# Patient Record
Sex: FEMALE | Race: WHITE | Employment: FULL TIME | URBAN - METROPOLITAN AREA
[De-identification: names, ages, dates, MRNs, and addresses within clinical notes are randomized per-mention and may not be internally consistent; named-entity substitution may affect disease eponyms.]

---

## 2017-02-01 ENCOUNTER — OFFICE VISIT (OUTPATIENT)
Dept: FAMILY MEDICINE CLINIC | Age: 22
End: 2017-02-01

## 2017-02-01 VITALS
RESPIRATION RATE: 16 BRPM | TEMPERATURE: 97.3 F | DIASTOLIC BLOOD PRESSURE: 59 MMHG | BODY MASS INDEX: 24.63 KG/M2 | HEIGHT: 63 IN | SYSTOLIC BLOOD PRESSURE: 106 MMHG | WEIGHT: 139 LBS | OXYGEN SATURATION: 100 % | HEART RATE: 72 BPM

## 2017-02-01 DIAGNOSIS — F90.9 ATTENTION DEFICIT HYPERACTIVITY DISORDER (ADHD), UNSPECIFIED ADHD TYPE: Primary | ICD-10-CM

## 2017-02-01 RX ORDER — METHYLPHENIDATE HYDROCHLORIDE 18 MG/1
18 TABLET, EXTENDED RELEASE ORAL
Qty: 30 TAB | Refills: 0 | Status: SHIPPED | OUTPATIENT
Start: 2017-02-01 | End: 2017-05-01 | Stop reason: DRUGHIGH

## 2017-02-01 NOTE — PATIENT INSTRUCTIONS
Welcome! I treat ADHD here, but I require that the patient be worked up by a specialist and properly diagnosed before I treat. The insurance companies are also refusing to pay for medication unless it is diagnosed properly. Please sign the release so we can get your records here. My patients with ADD or ADHD I have to physically see in the office every 3 months. At one month and 2 months, you may call or e-mail for a refill and pick it up at the desk. If dosing adjustments need to be made you will have to be seen.  If it gets too complicated, I will refer you to a specialist.

## 2017-02-01 NOTE — PROGRESS NOTES
HISTORY OF PRESENT ILLNESS  Arnav Bingham is a 19 y.o. female. HPI Comments: Meliton Arreola is here to establish care (she was referred here by a friend that comes here) and get back on meds for ADHD. She was diagnosed when she was in 2nd grade (she thinks the pediatrician) and most recently was getting the medication from her dad's doctor before moving here. She hasn't had the medication since the fall and notices a difference without it. She was on Concerta, doesn't remember the dose but recalls that the doctor had to lower the dose   Otherwise, she is healthy, no medical problems. Review of Systems   Constitutional: Negative for chills and fever. HENT: Negative for congestion, ear pain and sore throat. Eyes: Negative for discharge and redness. Respiratory: Negative for cough and shortness of breath. Cardiovascular: Negative for chest pain, palpitations and orthopnea. Gastrointestinal: Negative for abdominal pain, nausea and vomiting. Skin: Negative for rash. Neurological: Negative for weakness and headaches. Endo/Heme/Allergies: Does not bruise/bleed easily. Physical Exam   Constitutional: Vital signs are normal. She appears well-developed and well-nourished. HENT:   Right Ear: Tympanic membrane and ear canal normal.   Left Ear: Tympanic membrane and ear canal normal.   Nose: Nose normal.   Mouth/Throat: Uvula is midline, oropharynx is clear and moist and mucous membranes are normal.   Eyes: Pupils are equal, round, and reactive to light. Neck: No thyromegaly present. Cardiovascular: Normal rate, regular rhythm and normal heart sounds. No murmur heard. Pulmonary/Chest: Effort normal and breath sounds normal. No respiratory distress. She has no wheezes. She has no rales. Lymphadenopathy:     She has no cervical adenopathy. Skin: No rash noted. Psychiatric: She has a normal mood and affect. Nursing note and vitals reviewed.       ASSESSMENT and PLAN    ICD-10-CM ICD-9-CM 1. Attention deficit hyperactivity disorder (ADHD), unspecified ADHD type F90.9 314.01        AVS instructions reviewed with patient, pt verbalized understanding

## 2017-02-01 NOTE — PROGRESS NOTES
Rm 1  Pt presents to the clinic for a medication refill. Pt takes concerta, but can't remember the dosage. Flu shot requested: no    Depression Screening Completed: yes    Learning Assessment Completed: yes    Abuse Screening Completed: n/a    Health Maintenance reviewed and discussed per provider: yes    Advance Directive:    1. Do you have an advance directive in place? Patient Reply: no    2. If not, would you like material regarding how to put one in place? Patient Reply: no     Coordination of Care:    1. Have you been to the ER, urgent care clinic since your last visit? Hospitalized since your last visit? no    2. Have you seen or consulted any other health care providers outside of the [address] [place] [address] since your last visit? Include any pap smears or colon screening.  no

## 2017-02-01 NOTE — MR AVS SNAPSHOT
Visit Information Date & Time Provider Department Dept. Phone Encounter #  
 2/1/2017  7:15 AM Blanca Helton  [address] [phone] [id number] Follow-up Instructions Return in about 3 months (around 5/1/2017), or if symptoms worsen or fail to improve. Upcoming Health Maintenance Date Due  
 HPV AGE 9Y-34Y (1 of 3 - Female 3 Dose Series) 5/5/2004 DTaP/Tdap/Td series (1 - Tdap) 5/5/2014 PAP AKA CERVICAL CYTOLOGY 5/5/2014 INFLUENZA AGE 9 TO ADULT 8/1/2016 Allergies as of 2/1/2017  Review Complete On: 2/1/2017 By: Antony Henriquez LPN No Known Allergies Current Immunizations  Never Reviewed No immunizations on file. Not reviewed this visit You Were Diagnosed With   
  
 Codes Comments Attention deficit hyperactivity disorder (ADHD), unspecified ADHD type    -  Primary ICD-10-CM: F90.9 ICD-9-CM: 314.01 Vitals BP Pulse Temp Resp Height(growth percentile) Weight(growth percentile) 106/59 (BP 1 Location: Right arm, BP Patient Position: Sitting) 72 97.3 °F (36.3 °C) (Oral) 16 5' 3\" (1.6 m) 139 lb (63 kg) LMP SpO2 BMI OB Status Smoking Status 01/16/2017 (Approximate) 100% 24.62 kg/m2 Having regular periods Never Smoker Vitals History BMI and BSA Data Body Mass Index Body Surface Area  
 24.62 kg/m 2 1.67 m 2 Your Updated Medication List  
  
   
This list is accurate as of: 2/1/17  7:57 AM.  Always use your most recent med list.  
  
  
  
  
 CONCERTA 18 mg CR tablet Generic drug:  methylphenidate Take 1 Tab (18 mg total) by mouth every morning. Max Daily Amount: 18 mg  
  
  
  
  
Prescriptions Printed Refills CONCERTA 18 mg CR tablet 0 Sig: Take 1 Tab (18 mg total) by mouth every morning. Max Daily Amount: 18 mg Class: Print Route: Oral  
  
Follow-up Instructions Return in about 3 months (around 5/1/2017), or if symptoms worsen or fail to improve. Patient Instructions Welcome! I treat ADHD here, but I require that the patient be worked up by a specialist and properly diagnosed before I treat. The insurance companies are also refusing to pay for medication unless it is diagnosed properly. Please sign the release so we can get your records here. My patients with ADD or ADHD I have to physically see in the office every 3 months. At one month and 2 months, you may call or e-mail for a refill and pick it up at the desk. If dosing adjustments need to be made you will have to be seen. If it gets too complicated, I will refer you to a specialist. 
 
 
 
  
Introducing [facility]! Amy Curry introduces [employer] patient portal. Now you can access parts of your medical record, email your doctor's office, and request medication refills online. 1. In your internet browser, go to [email or web address] 2. Click on the First Time User? Click Here link in the Sign In box. You will see the New Member Sign Up page. 3. Enter your [employer] Access Code exactly as it appears below. You will not need to use this code after youve completed the sign-up process. If you do not sign up before the expiration date, you must request a new code. · [employer] Access Code: [email or web address] Expires: 5/2/2017  7:57 AM 
 
4. Enter the last four digits of your Social Security Number (xxxx) and Date of Birth (mm/dd/yyyy) as indicated and click Submit. You will be taken to the next sign-up page. 5. Create a [employer] ID. This will be your [employer] login ID and cannot be changed, so think of one that is secure and easy to remember. 6. Create a [employer] password. You can change your password at any time. 7. Enter your Password Reset Question and Answer. This can be used at a later time if you forget your password. 8. Enter your e-mail address. You will receive e-mail notification when new information is available in [address] 9. Click Sign Up. You can now view and download portions of your medical record. 10. Click the Download Summary menu link to download a portable copy of your medical information. If you have questions, please visit the Frequently Asked Questions section of the "[employer]" website. Remember, "[employer]" is NOT to be used for urgent needs. For medical emergencies, dial 911. Now available from your iPhone and Android! Please provide this summary of care documentation to your next provider. If you have any questions after today's visit, please call [phone].

## 2017-03-17 ENCOUNTER — OFFICE VISIT (OUTPATIENT)
Dept: OBGYN CLINIC | Age: 22
End: 2017-03-17

## 2017-03-17 VITALS
DIASTOLIC BLOOD PRESSURE: 63 MMHG | WEIGHT: 151 LBS | HEART RATE: 68 BPM | HEIGHT: 63 IN | SYSTOLIC BLOOD PRESSURE: 102 MMHG | BODY MASS INDEX: 26.75 KG/M2

## 2017-03-17 DIAGNOSIS — Z30.09 FAMILY PLANNING: Primary | ICD-10-CM

## 2017-03-17 NOTE — PROGRESS NOTES
Name: Geraldo Díaz MRN: [id number]    YOB: 1995  Age: 19 y.o. Sex: female        Chief Complaint   Patient presents with    Family Planning       HPI    1. Family planning--> Notes that she got  in August of this year, using withdrawal and Plan B for contraception, does not want to have children for another 2+ years, used pills in the past but did not remember it well. Would like to know best birth control    OB History      Para Term  AB TAB SAB Ectopic Multiple Living    0 0 0 0 0 0 0 0 0 0        Obstetric Comments      Patient's last menstrual period was 2017. Periods regular, last 4-5 days, flow medium, moderate dysmenorrhea  History of sexually transmitted infections never  Last pap 1-2 years ago           PGyn  History   Sexual Activity    Sexual activity: Yes    Partners: Male    Birth control/ protection: None     Comment: happy with her          Past Medical History:   Diagnosis Date    ADHD (attention deficit hyperactivity disorder)        History reviewed. No pertinent surgical history. Allergies   Allergen Reactions    Latex Swelling       Current Outpatient Prescriptions on File Prior to Visit   Medication Sig Dispense Refill    CONCERTA 18 mg CR tablet Take 1 Tab (18 mg total) by mouth every morning. Max Daily Amount: 18 mg 30 Tab 0     No current facility-administered medications on file prior to visit. Social History     Social History    Marital status:      Spouse name: N/A    Number of children: N/A    Years of education: N/A     Occupational History    Not on file.      Social History Main Topics    Smoking status: Never Smoker    Smokeless tobacco: Never Used    Alcohol use 1.8 oz/week     3 Cans of beer per week      Comment: per week    Drug use: No    Sexual activity: Yes     Partners: Male     Birth control/ protection: None      Comment: happy with her      Other Topics Concern    Not on file     Social History Narrative       Family History   Problem Relation Age of Onset    Cancer Mother      skin    Cancer Father      prostate    Heart Attack Father     Ovarian Cancer Paternal Grandmother        Review of Systems   Constitutional: Negative. HENT: Negative. Respiratory: Negative. Cardiovascular: Negative. Gastrointestinal: Negative. Genitourinary: Negative. Musculoskeletal: Negative. Skin: Negative. Neurological: Negative. Psychiatric/Behavioral: Negative. Visit Vitals    /63 (BP 1 Location: Left arm, BP Patient Position: Sitting)    Pulse 68    Ht 5' 3\" (1.6 m)    Wt 151 lb (68.5 kg)    LMP 03/17/2017    BMI 26.75 kg/m2       GENERAL:  Well developed, well nourished, in no distress  NEURO/PSYCHE: Grossly intact, normal mood and affect  HEENT: Normal cephalic, atraumatic, good dentition, neck supple. No thyromegaly  CV: regular rate and rhythm  LUNGS: clear to auscultation bilaterally, no wheezes, rhonchi or rales, good air entry with normal effort  ABDOMEN: + BS, soft without tenderness, no guarding, rebound or masses  EXTREMITIES: no edema or erythema noted  SKIN:  Warm, dry, no lesions          No results found for this or any previous visit (from the past 24 hour(s)). 1. Family planning  Reviewed all forms of birth control with pt, all of her questions were answered, is most interested in a LARC, may go to [place] or [place] to get a nexplanon. Reviewed that we would be happy to do a IUD for her. All of her questions were answered    2.  Dysmenorrhea  Will likely improve with BC    F/U 3 mos for well woman    Face to face time 30 mins with greater than 50% counseling      Face to face time 30 mins with greater than 50% counseling

## 2017-03-17 NOTE — PATIENT INSTRUCTIONS
Implant for Birth Control: Care Instructions  Your Care Instructions    The implant is used to prevent pregnancy. It's a thin dhara about the size of a matchstick that is inserted under the skin (subdermal) on the inside of your arm. The implant prevents pregnancy for 3 years. After it is put in, you don't have to do anything else to prevent pregnancy. Follow-up care is a key part of your treatment and safety. Be sure to make and go to all appointments, and call your doctor if you are having problems. It's also a good idea to know your test results and keep a list of the medicines you take. How can you care for yourself at home? How do you use the subdermal implant? · The implant is put in and taken out by your doctor or another trained health professional. This is done in your doctor's office. It only takes a few minutes. · Ask your doctor if you need to use backup birth control, such as a condom. And ask if (and how long) you should avoid intercourse after you get the implant. You may need to do this, depending on where you are in your cycle. What else do you need to know? · The implant has side effects. ¨ You may have changes in your period. Your period may stop. You may also have spotting or bleeding between periods. ¨ You may have mood changes, less interest in sex, or weight gain. · Remember that 3 years after you receive the implant, you must have it removed or get a new one. ¨ If you don't replace the implant and don't use another form of birth control, you could get pregnant. ¨ If you have the implant removed, you'll have to find another method of birth control. If you don't, you may get pregnant. ¨ Even if you are planning to get pregnant, you have to have the implant removed. · Check with your doctor before you use any other medicines. This includes over-the-counter medicines, vitamins, herbal products, and supplements.  Birth control hormones may not work as well to prevent pregnancy when combined with other medicines. · The implant doesn't protect against sexually transmitted infections (STIs), such as herpes or HIV/AIDS. If you're not sure if your sex partner might have an STI, use a condom to protect against infection. When should you call for help? Call your doctor now or seek immediate medical care if:  · You have severe belly pain. Watch closely for changes in your health, and be sure to contact your doctor if:  · You think you might be pregnant. · You have any problems with your birth control method. · You think you may be depressed. · You regularly have spotting. · You think you may have been exposed to or have a sexually transmitted infection. Where can you learn more? Go to http://tamy-keerthi.info/. Enter Q221 in the search box to learn more about \"Implant for Birth Control: Care Instructions. \"  Current as of: May 30, 2016  Content Version: 11.1  © 2999-4970 [employer]. Care instructions adapted under license by [employer] (which disclaims liability or warranty for this information). If you have questions about a medical condition or this instruction, always ask your healthcare professional. [place] [address] any warranty or liability for your use of this information. Ring for Birth Control: Care Instructions  Your Care Instructions    The ring is used to prevent pregnancy. It's a soft plastic ring that you put into your vagina. It's also called the vaginal ring. It gives you a regular dose of the hormones estrogen and progestin. The ring protects against pregnancy for 1 month at a time. You wear one ring for 3 weeks in a row and then go without a ring for 1 week. During this week, you have your period. Follow-up care is a key part of your treatment and safety. Be sure to make and go to all appointments, and call your doctor if you are having problems.  It's also a good idea to know your test results and keep a list of the medicines you take. How can you care for yourself at home? How do you use the ring? · Talk to your doctor about the best day to start using the ring. Usually, a ring is started during one of the first 5 days of your period. Ask your doctor if you need to avoid intercourse or use backup birth control, such as a condom, for the first 7 days. · Insert the ring according to the package instructions. You can't put the ring in incorrectly. It works no matter what its position in the vagina is. · Note the day you put the ring in. Leave the ring in for 3 weeks. You don't have to remove the ring when you have intercourse. · When the 3 weeks are up, take the ring out on the same day of the week that you put it in. Don't use another ring for 7 days. You will have your period during these 7 days. · After the 7-day break, put in a new ring on the same day of the week that you did 4 weeks earlier. You may still be having your period. What if you forget to change the ring or it comes out? Always read the label for specific instructions, or call your doctor. Here are some basic guidelines:  · If you leave the ring in for more than 4 weeks, remove it. Put in a new ring. Use backup birth control, such as a condom, or don't have intercourse until the new ring has been in place for 7 days. If you had intercourse, you can use emergency contraception, such as the morning-after pill (Plan B). You can use emergency contraception for up to 5 days after having had sex, but it works best if you take it right away. · If a ring slips out and it is out of your vagina for less than 3 hours, you are still protected from pregnancy. The ring can be rinsed and reinserted. · If a ring is out of the vagina for more than 3 hours, you may not be protected from pregnancy. Rinse and reinsert the ring or put in a new one as soon as possible.  Use backup birth control or don't have intercourse until a new ring has been in place for 7 days. If you had intercourse, you can use emergency contraception. What else do you need to know? · The ring has side effects. ¨ You may have very light or skipped periods. ¨ You may have bleeding between periods (spotting). This usually decreases after 3 to 4 months. ¨ You may have mood changes, less interest in sex, or weight gain. ¨ You may have vaginal discharge or irritation of the vagina. · Check with your doctor before you use any other medicines, including over-the-counter medicines, vitamins, herbal products, and supplements. Birth control hormones may not work as well to prevent pregnancy when combined with other medicines. · The ring doesn't protect against sexually transmitted diseases (STDs), such as herpes or HIV/AIDS. If you're not sure whether your sex partner might have an STD, use a condom to protect against disease. When should you call for help? Call your doctor now or seek immediate medical care if:  · You have severe belly pain. · You have signs of a blood clot, such as:  ¨ Pain in your calf, back of the knee, thigh, or groin. ¨ Redness and swelling in your leg or groin. · You have blurred vision or other problems seeing. · You have a severe headache. · You have severe trouble breathing. Watch closely for changes in your health, and be sure to contact your doctor if:  · You think you might be pregnant. · You think you may be depressed. · You think you may have been exposed to or have a sexually transmitted disease. Where can you learn more? Go to http://tamy-keerthi.info/. Zeinab Shi in the search box to learn more about \"Ring for Birth Control: Care Instructions. \"  Current as of: May 30, 2016  Content Version: 11.1  © 3643-3614 [employer]. Care instructions adapted under license by [employer] (which disclaims liability or warranty for this information).  If you have questions about a medical condition or this instruction, always ask your healthcare professional. [place] [address] any warranty or liability for your use of this information. Intrauterine Device (IUD) Insertion: Care Instructions  Your Care Instructions    The intrauterine device (IUD) is a very effective method of birth control. It is a small, plastic, T-shaped device that contains copper or hormones. The doctor inserts the IUD into your uterus. A plastic string tied to the end of the IUD hangs down through the cervix into the vagina. There are two types of IUDs. The copper IUD is effective for up to 10 years. The hormonal IUD is effective for either 3 years or 5 years, depending on which IUD is used. The hormonal IUD also reduces menstrual bleeding and cramping. Both types of IUD damage or kill the man's sperm. This means that the woman's egg does not join with the sperm. IUDs also change the lining of the uterus so that the egg does not lodge there. The IUD is most likely to work well for women who have been pregnant before. Some women who have never been pregnant have more trouble keeping the IUD in the uterus. They also may have more pain and cramping after insertion. Follow-up care is a key part of your treatment and safety. Be sure to make and go to all appointments, and call your doctor if you are having problems. It's also a good idea to know your test results and keep a list of the medicines you take. How can you care for yourself at home? · You may experience some mild cramping and light bleeding (spotting) for 1 or 2 days. Use a hot water bottle or a heating pad set on low on your belly for pain. · Take an over-the-counter pain medicine, such as acetaminophen (Tylenol), ibuprofen (Advil, Motrin), and naproxen (Aleve) if needed. Read and follow all instructions on the label. · Do not take two or more pain medicines at the same time unless the doctor told you to. Many pain medicines have acetaminophen, which is Tylenol.  Too much acetaminophen (Tylenol) can be harmful. · Check the string of your IUD after every period. To do this, insert a finger into your vagina and feel for the cervix, which is at the top of the vagina and feels harder than the rest of your vagina. You should be able to feel the thin, plastic string coming out of the opening of your cervix. If you cannot feel the string, use another form of birth control and make an appointment with your doctor to have the string checked. · If the IUD comes out, save it and call your doctor. Be sure to use another form of birth control while the IUD is out. · Use latex condoms to protect against sexually transmitted infections (STIs), such as gonorrhea and chlamydia. An IUD does not protect you from STIs. Having one sex partner (who does not have STIs and does not have sex with anyone else) is a good way to avoid STIs. When should you call for help? Call 911 anytime you think you may need emergency care. For example, call if:  · You passed out (lost consciousness). · You have sudden, severe pain in your belly or pelvis. Call your doctor now or seek immediate medical care if:  · You have new belly or pelvic pain. · You have severe vaginal bleeding. This means that you are soaking through your usual pads or tampons each hour for 2 or more hours. · You are dizzy or lightheaded, or you feel like you may faint. · You have a fever and pelvic pain or vaginal discharge. · You have pelvic pain that is getting worse. Watch closely for changes in your health, and be sure to contact your doctor if:  · You cannot feel the string, or the IUD comes out. · You feel sick to your stomach, or you vomit. · You think you may be pregnant. Where can you learn more? Go to http://tamy-keerthi.info/. Enter J866 in the search box to learn more about \"Intrauterine Device (IUD) Insertion: Care Instructions. \"  Current as of:  May 30, 2016  Content Version: 11.1  © 8954-6926 [employer], [employer]. Care instructions adapted under license by [employer] (which disclaims liability or warranty for this information). If you have questions about a medical condition or this instruction, always ask your healthcare professional. [place] [address] any warranty or liability for your use of this information. Intrauterine Device (IUD) for Birth Control: Care Instructions  Your Care Instructions    The intrauterine device (IUD) is used to prevent pregnancy. It's a small, plastic, T-shaped device. Your doctor places the IUD in your uterus. You are using either a hormonal IUD or a copper IUD. · Hormonal IUDs prevent pregnancy for 3 to 5 years, depending on which IUD is used. Once you have it, you don't have to do anything else to prevent pregnancy. · The copper IUD prevents pregnancy for 10 years. Once you have it, you don't have to do anything to prevent pregnancy. A string tied to the end of the IUD hangs down through the opening of the uterus (called the cervix) into the vagina. You can check that the IUD is in place by feeling for the string. The IUD usually stays in the uterus until your doctor removes it. Follow-up care is a key part of your treatment and safety. Be sure to make and go to all appointments, and call your doctor if you are having problems. It's also a good idea to know your test results and keep a list of the medicines you take. How can you care for yourself at home? How do you use the IUD? · Your doctor inserts the IUD. This takes only a few minutes and can be done at your doctor's office. · Your doctor may have you feel for the IUD string right after insertion, to be sure you know what it feels like. · Check for the string after every period.   ¨ Insert a finger into your vagina and feel for the cervix, which is at the top of the vagina and feels harder than the rest of your vagina (some women say it feels like the tip of your nose).  ¨ You should be able to feel the thin, plastic string coming out of the opening of your cervix. If you cannot feel the string, it doesn't always mean that the IUD is out of place. Sometimes the string is just difficult to feel or has been pulled up into the cervical canal (which will not harm you). · Your doctor may want to see you 4 to 6 weeks after the IUD insertion, to make sure it is in place. What if you think the IUD is not in place? Always read the label for specific instructions. Here are some basic guidelines:  · Call your doctor and use backup birth control, such as a condom, or don't have intercourse until you know the IUD is working. · If you have had intercourse, you can use emergency contraception, such as the morning-after pill (Plan B). You can use emergency contraception for up to 5 days after having had intercourse, but it works best if you take it right away. What else do you need to know? · The IUD has side effects. ¨ The hormonal IUD usually reduces menstrual flow and cramping over time. It can also cause spotting, mood swings, and breast tenderness. ¨ The copper IUD can cause longer and heavier periods. · After an IUD is first put in, you may have some mild cramping and light spotting for 1 to 2 days. · The IUD doesn't protect against sexually transmitted infections (STIs), such as herpes or HIV/AIDS. If you're not sure whether your sex partner might have an STI, use a condom to protect against disease. When should you call for help? Call your doctor now or seek immediate medical care if:  · You have severe pain in your belly or pelvis. · You have severe vaginal bleeding. This means that you are soaking through your usual pads or tampons each hour for 2 or more hours. · You have vaginal discharge that smells bad. · You have a fever and chills. · You think you might be pregnant.   Watch closely for changes in your health, and be sure to contact your doctor if:  · You cannot find the string of your IUD, or the string is shorter or longer than normal.  · You have any problems with your birth control method. · You think you may have been exposed to or have a sexually transmitted infection. Where can you learn more? Go to http://tamy-keerthi.info/. Enter B183 in the search box to learn more about \"Intrauterine Device (IUD) for Birth Control: Care Instructions. \"  Current as of: May 30, 2016  Content Version: 11.1  © 5805-0636 .[employer]. Care instructions adapted under license by [employer] (which disclaims liability or warranty for this information). If you have questions about a medical condition or this instruction, always ask your healthcare professional. [address] any warranty or liability for your use of this information.

## 2017-05-01 ENCOUNTER — OFFICE VISIT (OUTPATIENT)
Dept: FAMILY MEDICINE CLINIC | Age: 22
End: 2017-05-01

## 2017-05-01 VITALS
HEIGHT: 63 IN | BODY MASS INDEX: 27.64 KG/M2 | HEART RATE: 60 BPM | WEIGHT: 156 LBS | DIASTOLIC BLOOD PRESSURE: 70 MMHG | TEMPERATURE: 97 F | SYSTOLIC BLOOD PRESSURE: 117 MMHG | OXYGEN SATURATION: 100 % | RESPIRATION RATE: 16 BRPM

## 2017-05-01 DIAGNOSIS — Z13.31 SCREENING FOR DEPRESSION: ICD-10-CM

## 2017-05-01 DIAGNOSIS — F90.9 ATTENTION DEFICIT HYPERACTIVITY DISORDER (ADHD), UNSPECIFIED ADHD TYPE: Primary | ICD-10-CM

## 2017-05-01 RX ORDER — METHYLPHENIDATE HYDROCHLORIDE 27 MG/1
27 TABLET, EXTENDED RELEASE ORAL
Qty: 30 TAB | Refills: 0 | Status: SHIPPED | OUTPATIENT
Start: 2017-05-01

## 2017-05-01 NOTE — PROGRESS NOTES
Rm 1  Pt presents to the clinic for a med refill. Pt also wants to discuss a stronger dosage. Depression Screening Completed: yes    Learning Assessment Completed: yes    Abuse Screening Completed: n/a    Health Maintenance reviewed and discussed per provider: yes     Coordination of Care:    1. Have you been to the ER, urgent care clinic since your last visit? Hospitalized since your last visit? no    2. Have you seen or consulted any other health care providers outside of the [address] [place] [address] since your last visit? Include any pap smears or colon screening.  no

## 2017-05-01 NOTE — PROGRESS NOTES
HISTORY OF PRESENT ILLNESS  Arik Smith is a 19 y.o. female. HPI Comments: Pt is here for for 3 month follow up of ADD/ADHD. She feels like the medicine helps but wonders about bumping up the dose a bit. She's on 18 mg aily now. Denies chest pain, palpitations, insomnia. Medication Refill   Pertinent negatives include no chest pain, no abdominal pain, no headaches and no shortness of breath. Review of Systems   Constitutional: Negative for chills and fever. HENT: Negative for sore throat. Eyes: Negative for blurred vision and double vision. Respiratory: Negative for cough and shortness of breath. Cardiovascular: Negative for chest pain and palpitations. Gastrointestinal: Negative for abdominal pain, nausea and vomiting. Genitourinary: Negative for dysuria and urgency. Neurological: Negative for headaches. Physical Exam   Constitutional: Vital signs are normal. She appears well-developed and well-nourished. HENT:   Right Ear: Tympanic membrane and ear canal normal.   Left Ear: Tympanic membrane and ear canal normal.   Nose: Nose normal.   Mouth/Throat: Uvula is midline, oropharynx is clear and moist and mucous membranes are normal.   Eyes: Pupils are equal, round, and reactive to light. Neck: No thyromegaly present. Cardiovascular: Normal rate, regular rhythm and normal heart sounds. Pulmonary/Chest: Effort normal and breath sounds normal. No respiratory distress. She has no wheezes. Abdominal: She exhibits no distension. Lymphadenopathy:     She has no cervical adenopathy. Skin: No rash noted. Nursing note and vitals reviewed. ASSESSMENT and PLAN    ICD-10-CM ICD-9-CM    1. Attention deficit hyperactivity disorder (ADHD), unspecified ADHD type W39.9 361.71 CONCERTA 27 mg CR tablet       Will increase to 27 mg.     AVS instructions reviewed with patient, pt verbalized understanding

## 2017-05-01 NOTE — MR AVS SNAPSHOT
Visit Information Date & Time Provider Department Dept. Phone Encounter #  
 5/1/2017  7:00 AM Harriet Nelson, [address] [phone] [id number] Follow-up Instructions Return in about 3 months (around 8/1/2017). Your Appointments 6/19/2017  3:30 PM  
ANNUAL with Venus Hendrix MD  
[facility] OB [facility]YN (Paula Palacio) Appt Note: annual  
 [address], [place] [facility] [address] [phone]  
  
   
 [address], [address] [facility] Upcoming Health Maintenance Date Due  
 HPV AGE 9Y-34Y (1 of 3 - Female 3 Dose Series) 5/5/2004 DTaP/Tdap/Td series (1 - Tdap) 5/5/2014 PAP AKA CERVICAL CYTOLOGY 5/5/2014 INFLUENZA AGE 9 TO ADULT 8/1/2017 Allergies as of 5/1/2017  Review Complete On: 5/1/2017 By: Harriet Nelson MD  
  
 Severity Noted Reaction Type Reactions Latex  03/17/2017    Swelling Current Immunizations  Never Reviewed No immunizations on file. Not reviewed this visit You Were Diagnosed With   
  
 Codes Comments Attention deficit hyperactivity disorder (ADHD), unspecified ADHD type    -  Primary ICD-10-CM: F90.9 ICD-9-CM: 314.01 Vitals BP Pulse Temp Resp Height(growth percentile) Weight(growth percentile) 117/70 (BP 1 Location: Right arm, BP Patient Position: Sitting) 60 97 °F (36.1 °C) (Oral) 16 5' 3\" (1.6 m) 156 lb (70.8 kg) LMP SpO2 BMI OB Status Smoking Status 04/26/2017 (Approximate) 100% 27.63 kg/m2 Having regular periods Never Smoker Vitals History BMI and BSA Data Body Mass Index Body Surface Area  
 27.63 kg/m 2 1.77 m 2 Preferred Pharmacy Pharmacy Name Phone [facility] [address] [place] [address] [phone] Your Updated Medication List  
  
   
This list is accurate as of: 5/1/17  7:30 AM.  Always use your most recent med list.  
  
  
 CONCERTA 27 mg CR tablet Generic drug:  methylphenidate Take 1 Tab (27 mg total) by mouth every morning. Max Daily Amount: 27 mg  
  
  
  
  
Prescriptions Printed Refills CONCERTA 27 mg CR tablet 0 Sig: Take 1 Tab (27 mg total) by mouth every morning. Max Daily Amount: 27 mg  
 Class: Print Route: Oral  
  
Follow-up Instructions Return in about 3 months (around 8/1/2017). Patient Instructions Recheck 3 months, sooner if needed Introducing [facility] SERVICES! Jose Guadalupe Carlos introduces [employer] patient portal. Now you can access parts of your medical record, email your doctor's office, and request medication refills online. 1. In your internet browser, go to [email or web address] 2. Click on the First Time User? Click Here link in the Sign In box. You will see the New Member Sign Up page. 3. Enter your Mod[employer] Access Code exactly as it appears below. You will not need to use this code after youve completed the sign-up process. If you do not sign up before the expiration date, you must request a new code. · Mod[employer]ri Access Code: [email or web address] Expires: 5/2/2017  8:57 AM 
 
4. Enter the last four digits of your Social Security Number (xxxx) and Date of Birth (mm/dd/yyyy) as indicated and click Submit. You will be taken to the next sign-up page. 5. Create a Modustri ID. This will be your Mod[employer]ri login ID and cannot be changed, so think of one that is secure and easy to remember. 6. Create a Mod[employer] password. You can change your password at any time. 7. Enter your Password Reset Question and Answer. This can be used at a later time if you forget your password. 8. Enter your e-mail address. You will receive e-mail notification when new information is available in [address] 9. Click Sign Up. You can now view and download portions of your medical record.  
10. Click the Download Summary menu link to download a portable copy of your medical information. If you have questions, please visit the Frequently Asked Questions section of the [employer] website. Remember, [employer] is NOT to be used for urgent needs. For medical emergencies, dial 911. Now available from your iPhone and Android! Please provide this summary of care documentation to your next provider. If you have any questions after today's visit, please call [phone].

## 2017-08-22 ENCOUNTER — OFFICE VISIT (OUTPATIENT)
Dept: OBGYN CLINIC | Age: 22
End: 2017-08-22

## 2017-08-22 VITALS
HEART RATE: 84 BPM | BODY MASS INDEX: 27.64 KG/M2 | SYSTOLIC BLOOD PRESSURE: 122 MMHG | DIASTOLIC BLOOD PRESSURE: 74 MMHG | WEIGHT: 156 LBS | HEIGHT: 63 IN

## 2017-08-22 DIAGNOSIS — N92.6 IRREGULAR MENSES: Primary | ICD-10-CM

## 2017-08-22 LAB
HCG URINE, QL. (POC): NEGATIVE
VALID INTERNAL CONTROL?: YES

## 2017-08-22 RX ORDER — NORETHINDRONE ACETATE AND ETHINYL ESTRADIOL 1MG-20(21)
1 KIT ORAL DAILY
Qty: 28 TAB | Refills: 12 | Status: SHIPPED | OUTPATIENT
Start: 2017-08-22

## 2017-08-22 NOTE — PATIENT INSTRUCTIONS
Ethinyl Estradiol/Norethindrone Acetate (By mouth)   Ethinyl Estradiol (ETH-i-nil es-tra-DYE-ol), Norethindrone Acetate (nor-ETH-in-drone AS-e-graves)  Prevents pregnancy. Also treats hot flashes during menopause and helps prevent osteoporosis after menopause. Brand Name(s): Blisovi 1050 Meridium, Blisovi Fe 1.5/30, Blisovi Fe 1/20, Estrostep Fe, Femhrt, Femhrt 1/5, HARJAVALTA, Gildess 1.5/30, Gildess 1/20, Gildess 1050 Meridium, Gildess FE 1.5/30, Gildess FE 1/20, Jevantique Lo, Dicky [place] 1.5/30   There may be other brand names for this medicine. When This Medicine Should Not Be Used: This medicine is not right for everyone. Do not use it if you had an allergic reaction to ethinyl estradiol or norethindrone, or if you are pregnant or have unusual vaginal bleeding. Do not use it if you have liver disease, migraine headaches, breast cancer, or problems with blood clots. Do not use it if you have high blood pressure, certain heart problems, or diabetes with kidney, eye, nerve, or blood vessel damage. How to Use This Medicine:   Tablet  · Your doctor will tell you how much medicine to use. Do not use more than directed. · Each brand of birth control pills has specific directions. Read and follow the instructions for your prescribed brand. Ask your doctor or pharmacist if you have any questions. · Take this medicine at the same time each day. Birth control pills work best when there is no more than 24 hours between doses. · Your body will need at least 7 days to adjust before a pregnancy will be prevented when you first use this medicine. Use a second form of birth control, such as a condom, spermicide, or diaphragm, for the first 7 days of your first cycle of pills. · Read and follow the patient instructions that come with this medicine. Talk to your doctor or pharmacist if you have any questions. · Missed dose: This medicine has specific patient instructions on what to do if you miss a dose.  Read and follow these instructions carefully and call your doctor if you have any questions. ¨ Make sure your doctor knows if you miss your period 2 months in a row, because you could be pregnant. ¨ You may not have a period for that month if you miss more than one dose or change your schedule. ¨ You could have light bleeding or spotting if you do not take a pill on time. The more pills you miss, the more likely you are to have bleeding. · Store the medicine in a closed container at room temperature, away from heat, moisture, and direct light. Drugs and Foods to Avoid:   Ask your doctor or pharmacist before using any other medicine, including over-the-counter medicines, vitamins, and herbal products. · Some foods and medicines can affect how ethinyl estradiol and norethindrone combination works. Tell your doctor if you are also using Emily's wort, acetaminophen, aprepitant, aspirin, atorvastatin, boceprevir, bosentan, clofibrate, colesevelam, cyclosporine, morphine, prednisolone, rifabutin, rifampicin, rosuvastatin, telaprevir, temazepam, theophylline, tizanidine, medicine to treat an infection, medicine to treat HIV/AIDS, medicine for seizures, thyroid replacement medicine, or any other medicine that contains hormones, such as other treatments for menopause. · Do not eat grapefruit or drink grapefruit juice while you are using this medicine. Warnings While Using This Medicine:   · Tell your doctor right away if you think you are pregnant. This medicine could harm your unborn baby if you take it while you are pregnant. · Tell your doctor if you are breastfeeding, or if you had a baby within 4 weeks before you start using this medicine. Tell your doctor if you have kidney disease, asthma, cervical cancer, diabetes, epilepsy, gallbladder problems, heart or blood vessel disease, high cholesterol, or a family history of breast cancer or depression. Tell your doctor if you smoke.   · This medicine may cause the following problems:  ¨ Higher risk of heart attack, stroke, or blood clots  ¨ Possible risk of breast or cervical cancer  ¨ Liver cancers or tumors  ¨ Changes in vision  ¨ Gallbladder disease  ¨ High blood pressure  · You may need to stop using this medicine for a few weeks before and after you have surgery because of the risk of blood clots. · This medicine will not protect you from HIV/AIDS or other sexually transmitted diseases. · Tell any doctor or dentist who treats you that you are using this medicine. This medicine may affect certain medical test results. · Your doctor will check the effects of this medicine at regular visits. Keep all appointments. · Keep all medicine out of the reach of children. Never share your medicine with anyone. Possible Side Effects While Using This Medicine:   Call your doctor right away if you notice any of these side effects:  · Allergic reaction: Itching or hives, swelling in your face or hands, swelling or tingling in your mouth or throat, chest tightness, trouble breathing  · Chest pain that may spread, unusual sweating, or fainting  · Dark urine or pale stools, loss of appetite, stomach pain, yellow skin or eyes  · Fast or pounding heartbeat  · Numbness or weakness on one side of your body  · Pain in your lower leg (calf)  · Sudden or severe headache, problems with vision, speech, or walking  · Trouble breathing or coughing up blood  · Unusual or unexpected vaginal bleeding or heavy bleeding  If you notice these less serious side effects, talk with your doctor:   · Depression, mood changes  · Vision changes or trouble wearing contact lenses  If you notice other side effects that you think are caused by this medicine, tell your doctor. Call your doctor for medical advice about side effects.  You may report side effects to FDA at 7-043-FDA-7243  © 2017 [address] Information is for End User's use only and may not be sold, redistributed or otherwise used for commercial purposes. The above information is an  only. It is not intended as medical advice for individual conditions or treatments. Talk to your doctor, nurse or pharmacist before following any medical regimen to see if it is safe and effective for you.

## 2017-08-22 NOTE — PROGRESS NOTES
Name: Leo Lion MRN: [id number]    YOB: 1995  Age: 23 y.o. Sex: female        Chief Complaint   Patient presents with    Family Planning       HPI     1. Family planning--> Late for her cycle, thinks she has not had a period in 6 weeks, is not on BC at this time, last took Plan B a couple of days ago, would like to try COCs, reviewed all of her options    2. Preconceptual counseling--> Wondering how hard it will be for her to get pregnant    OB History      Para Term  AB Living    0 0 0 0 0 0    SAB TAB Ectopic Molar Multiple Live Births    0 0 0  0         Obstetric Comments      Patient's last menstrual period was 2017. Periods regular, last 4-5 days, flow medium, moderate dysmenorrhea  History of sexually transmitted infections never  Last pap 1-2 years ago           PGyn    History   Sexual Activity    Sexual activity: Yes    Partners: Male    Birth control/ protection: None     Comment: happy with her          Past Medical History:   Diagnosis Date    ADHD (attention deficit hyperactivity disorder)        History reviewed. No pertinent surgical history. Allergies   Allergen Reactions    Latex Swelling       Current Outpatient Prescriptions on File Prior to Visit   Medication Sig Dispense Refill    CONCERTA 27 mg CR tablet Take 1 Tab (27 mg total) by mouth every morning. Max Daily Amount: 27 mg 30 Tab 0     No current facility-administered medications on file prior to visit. Review of Systems   Constitutional: Negative. Genitourinary: Negative.             Visit Vitals    /74 (BP 1 Location: Left arm, BP Patient Position: Sitting)    Pulse 84    Ht 5' 3\" (1.6 m)    Wt 156 lb (70.8 kg)    BMI 27.63 kg/m2       GENERAL:  Well developed, well nourished, in no distress        Recent Results (from the past 24 hour(s))   AMB POC URINE PREGNANCY TEST, VISUAL COLOR COMPARISON    Collection Time: 17  4:17 PM   Result Value Ref Range    VALID INTERNAL CONTROL POC Yes     HCG urine, Ql. (POC) Negative Negative       1.  Irregular menses  Would like to try pills, takes concerta everyday and thinks she will not have a problem remembering    - AMB POC URINE PREGNANCY TEST, VISUAL COLOR COMPARISON        F/U 12 weeks

## 2017-08-22 NOTE — MR AVS SNAPSHOT
Visit Information Date & Time Provider Department Dept. Phone Encounter #  
 8/22/2017  4:30 PM Amy Fontanez MD [facility] [phone] [id number] Follow-up Instructions Return in about 3 months (around 11/22/2017) for well woman. Upcoming Health Maintenance Date Due  
 HPV AGE 9Y-34Y (1 of 3 - Female 3 Dose Series) 5/5/2004 PAP AKA CERVICAL CYTOLOGY 5/5/2014 INFLUENZA AGE 9 TO ADULT 8/1/2017 Allergies as of 8/22/2017  Review Complete On: 8/22/2017 By: Amy Fontanez MD  
  
 Severity Noted Reaction Type Reactions Latex  03/17/2017    Swelling Current Immunizations  Never Reviewed No immunizations on file. Not reviewed this visit You Were Diagnosed With   
  
 Codes Comments Irregular menses    -  Primary ICD-10-CM: N92.6 ICD-9-CM: 626.4 Vitals BP Pulse Height(growth percentile) Weight(growth percentile) BMI OB Status 122/74 (BP 1 Location: Left arm, BP Patient Position: Sitting) 84 5' 3\" (1.6 m) 156 lb (70.8 kg) 27.63 kg/m2 Unknown Smoking Status Never Smoker BMI and BSA Data Body Mass Index Body Surface Area  
 27.63 kg/m 2 1.77 m 2 Preferred Pharmacy Pharmacy Name Phone [facility] [address] [place] [address] [phone] Your Updated Medication List  
  
   
This list is accurate as of: 8/22/17  4:34 PM.  Always use your most recent med list.  
  
  
  
  
 CONCERTA 27 mg CR tablet Generic drug:  methylphenidate HCl Take 1 Tab (27 mg total) by mouth every morning. Max Daily Amount: 27 mg  
  
 norethindrone-ethinyl estradiol 1 mg-20 mcg (21)/75 mg (7) Tab Commonly known as:  Libra Reynoso FE 1/20 Take 1 Tab by mouth daily. Prescriptions Sent to Pharmacy Refills  
 norethindrone-ethinyl estradiol (JUNEL FE 1/20) 1 mg-20 mcg (21)/75 mg (7) tab 12 Sig: Take 1 Tab by mouth daily. Class: Normal  
 Pharmacy: [facility] [address] [place] [address] Ellen Núñez [address] [place] [address] #: [phone] Route: Oral  
  
We Performed the Following AMB POC URINE PREGNANCY TEST, VISUAL COLOR COMPARISON [16168 CPT(R)] Follow-up Instructions Return in about 3 months (around 11/22/2017) for well woman. Patient Instructions Ethinyl Estradiol/Norethindrone Acetate (By mouth) Ethinyl Estradiol (ETH-i-nil es-tra-DYE-ol), Norethindrone Acetate (nor-ETH-in-drone AS-e-graves) Prevents pregnancy. Also treats hot flashes during menopause and helps prevent osteoporosis after menopause. Brand Name(s): Blisovi [employer], Blisovi Fe 1.5/30, Blisovi Fe 1/20, Estrostep Fe, Femhrt, Femhrt 1/5, HARJAVALTA, Gildess 1.5/30, Gildess 1/20, Gildess [employer]50 [employer], Gildess FE 1.5/30, Gildess FE 1/20, Jevantique Lo, Carlean  1.5/30 There may be other brand names for this medicine. When This Medicine Should Not Be Used: This medicine is not right for everyone. Do not use it if you had an allergic reaction to ethinyl estradiol or norethindrone, or if you are pregnant or have unusual vaginal bleeding. Do not use it if you have liver disease, migraine headaches, breast cancer, or problems with blood clots. Do not use it if you have high blood pressure, certain heart problems, or diabetes with kidney, eye, nerve, or blood vessel damage. How to Use This Medicine:  
Tablet · Your doctor will tell you how much medicine to use. Do not use more than directed. · Each brand of birth control pills has specific directions. Read and follow the instructions for your prescribed brand. Ask your doctor or pharmacist if you have any questions. · Take this medicine at the same time each day. Birth control pills work best when there is no more than 24 hours between doses.  
· Your body will need at least 7 days to adjust before a pregnancy will be prevented when you first use this medicine. Use a second form of birth control, such as a condom, spermicide, or diaphragm, for the first 7 days of your first cycle of pills. · Read and follow the patient instructions that come with this medicine. Talk to your doctor or pharmacist if you have any questions. · Missed dose: This medicine has specific patient instructions on what to do if you miss a dose. Read and follow these instructions carefully and call your doctor if you have any questions. ¨ Make sure your doctor knows if you miss your period 2 months in a row, because you could be pregnant. ¨ You may not have a period for that month if you miss more than one dose or change your schedule. ¨ You could have light bleeding or spotting if you do not take a pill on time. The more pills you miss, the more likely you are to have bleeding. · Store the medicine in a closed container at room temperature, away from heat, moisture, and direct light. Drugs and Foods to Avoid: Ask your doctor or pharmacist before using any other medicine, including over-the-counter medicines, vitamins, and herbal products. · Some foods and medicines can affect how ethinyl estradiol and norethindrone combination works. Tell your doctor if you are also using Emily's wort, acetaminophen, aprepitant, aspirin, atorvastatin, boceprevir, bosentan, clofibrate, colesevelam, cyclosporine, morphine, prednisolone, rifabutin, rifampicin, rosuvastatin, telaprevir, temazepam, theophylline, tizanidine, medicine to treat an infection, medicine to treat HIV/AIDS, medicine for seizures, thyroid replacement medicine, or any other medicine that contains hormones, such as other treatments for menopause. · Do not eat grapefruit or drink grapefruit juice while you are using this medicine. Warnings While Using This Medicine: · Tell your doctor right away if you think you are pregnant.  This medicine could harm your unborn baby if you take it while you are pregnant. · Tell your doctor if you are breastfeeding, or if you had a baby within 4 weeks before you start using this medicine. Tell your doctor if you have kidney disease, asthma, cervical cancer, diabetes, epilepsy, gallbladder problems, heart or blood vessel disease, high cholesterol, or a family history of breast cancer or depression. Tell your doctor if you smoke. · This medicine may cause the following problems: 
¨ Higher risk of heart attack, stroke, or blood clots ¨ Possible risk of breast or cervical cancer ¨ Liver cancers or tumors ¨ Changes in vision ¨ Gallbladder disease ¨ High blood pressure · You may need to stop using this medicine for a few weeks before and after you have surgery because of the risk of blood clots. · This medicine will not protect you from HIV/AIDS or other sexually transmitted diseases. · Tell any doctor or dentist who treats you that you are using this medicine. This medicine may affect certain medical test results. · Your doctor will check the effects of this medicine at regular visits. Keep all appointments. · Keep all medicine out of the reach of children. Never share your medicine with anyone. Possible Side Effects While Using This Medicine:  
Call your doctor right away if you notice any of these side effects: · Allergic reaction: Itching or hives, swelling in your face or hands, swelling or tingling in your mouth or throat, chest tightness, trouble breathing · Chest pain that may spread, unusual sweating, or fainting · Dark urine or pale stools, loss of appetite, stomach pain, yellow skin or eyes · Fast or pounding heartbeat · Numbness or weakness on one side of your body · Pain in your lower leg (calf) · Sudden or severe headache, problems with vision, speech, or walking · Trouble breathing or coughing up blood · Unusual or unexpected vaginal bleeding or heavy bleeding If you notice these less serious side effects, talk with your doctor: · Depression, mood changes · Vision changes or trouble wearing contact lenses If you notice other side effects that you think are caused by this medicine, tell your doctor. Call your doctor for medical advice about side effects. You may report side effects to FDA at 3-578-CQL-2807 © 2017 [address] Information is for End User's use only and may not be sold, redistributed or otherwise used for commercial purposes. The above information is an  only. It is not intended as medical advice for individual conditions or treatments. Talk to your doctor, nurse or pharmacist before following any medical regimen to see if it is safe and effective for you. Introducing hospitals & HEALTH SERVICES! [facility] introduces [employer] patient portal. Now you can access parts of your medical record, email your doctor's office, and request medication refills online. 1. In your internet browser, go to [email or web address] 2. Click on the First Time User? Click Here link in the Sign In box. You will see the New Member Sign Up page. 3. Enter your [employer] Access Code exactly as it appears below. You will not need to use this code after youve completed the sign-up process. If you do not sign up before the expiration date, you must request a new code. · [employer] Access Code: [email or web address] Expires: 11/20/2017  4:34 PM 
 
4. Enter the last four digits of your Social Security Number (xxxx) and Date of Birth (mm/dd/yyyy) as indicated and click Submit. You will be taken to the next sign-up page. 5. Create a [employer] ID. This will be your [employer] login ID and cannot be changed, so think of one that is secure and easy to remember. 6. Create a [employer] password. You can change your password at any time. 7. Enter your Password Reset Question and Answer.  This can be used at a later time if you forget your password. 8. Enter your e-mail address. You will receive e-mail notification when new information is available in [address] 9. Click Sign Up. You can now view and download portions of your medical record. 10. Click the Download Summary menu link to download a portable copy of your medical information. If you have questions, please visit the Frequently Asked Questions section of the [employer] website. Remember, [employer] is NOT to be used for urgent needs. For medical emergencies, dial 911. Now available from your iPhone and Android! Please provide this summary of care documentation to your next provider. If you have any questions after today's visit, please call [phone].

## 2017-11-02 ENCOUNTER — OFFICE VISIT (OUTPATIENT)
Dept: OBGYN CLINIC | Age: 22
End: 2017-11-02

## 2017-11-02 ENCOUNTER — ROUTINE PRENATAL (OUTPATIENT)
Dept: OBGYN CLINIC | Age: 22
End: 2017-11-02

## 2017-11-02 ENCOUNTER — HOSPITAL ENCOUNTER (OUTPATIENT)
Dept: ULTRASOUND IMAGING | Age: 22
Discharge: HOME | End: 2017-11-02
Attending: ADVANCED PRACTICE MIDWIFE
Payer: OTHER GOVERNMENT

## 2017-11-02 VITALS — SYSTOLIC BLOOD PRESSURE: 106 MMHG | HEART RATE: 78 BPM | DIASTOLIC BLOOD PRESSURE: 68 MMHG

## 2017-11-02 VITALS
BODY MASS INDEX: 27.82 KG/M2 | WEIGHT: 157 LBS | HEART RATE: 78 BPM | HEIGHT: 63 IN | SYSTOLIC BLOOD PRESSURE: 106 MMHG | DIASTOLIC BLOOD PRESSURE: 68 MMHG

## 2017-11-02 DIAGNOSIS — R10.2 PELVIC PAIN IN FEMALE: Primary | ICD-10-CM

## 2017-11-02 DIAGNOSIS — R10.2 PELVIC PAIN IN FEMALE: ICD-10-CM

## 2017-11-02 DIAGNOSIS — Z87.42 H/O VAGINAL BLEEDING: ICD-10-CM

## 2017-11-02 DIAGNOSIS — N91.1 SECONDARY AMENORRHEA: ICD-10-CM

## 2017-11-02 PROCEDURE — 76817 TRANSVAGINAL US OBSTETRIC: CPT

## 2017-11-02 NOTE — PATIENT INSTRUCTIONS
Pelvic Pain: Care Instructions  Your Care Instructions    Pelvic pain, or pain in the lower belly, can have many causes. Often pelvic pain is not serious and gets better in a few days. If your pain continues or gets worse, you may need tests and treatment. Tell your doctor about any new symptoms. These may be signs of a serious problem. Follow-up care is a key part of your treatment and safety. Be sure to make and go to all appointments, and call your doctor if you are having problems. It's also a good idea to know your test results and keep a list of the medicines you take. How can you care for yourself at home? · Rest until you feel better. Lie down, and raise your legs by placing a pillow under your knees. · Drink plenty of fluids. You may find that small, frequent sips are easier on your stomach than if you drink a lot at once. Avoid drinks with carbonation or caffeine, such as soda pop, tea, or coffee. · Try eating several small meals instead of 2 or 3 large ones. Eat mild foods, such as rice, dry toast or crackers, bananas, and applesauce. Avoid fatty and spicy foods, other fruits, and alcohol until 48 hours after your symptoms have gone away. · Take an over-the-counter pain medicine, such as acetaminophen (Tylenol), ibuprofen (Advil, Motrin), or naproxen (Aleve). Read and follow all instructions on the label. · Do not take two or more pain medicines at the same time unless the doctor told you to. Many pain medicines have acetaminophen, which is Tylenol. Too much acetaminophen (Tylenol) can be harmful. · You can put a heating pad, a warm cloth, or moist heat on your belly to relieve pain. When should you call for help? Call your doctor now or seek immediate medical care if:  · You have a new or higher fever. · You have unusual vaginal bleeding. · You have new or worse belly or pelvic pain. · You have vaginal discharge that has increased in amount or smells bad.   Watch closely for changes in your health, and be sure to contact your doctor if:  · You do not get better as expected. Where can you learn more? Go to http://tamy-keerthi.info/. Enter [phone] in the search box to learn more about \"Pelvic Pain: Care Instructions. \"  Current as of: October 13, 2016  Content Version: 11.4  © 9037-6097 [employer]. Care instructions adapted under license by [employer] (which disclaims liability or warranty for this information). If you have questions about a medical condition or this instruction, always ask your healthcare professional. [address] any warranty or liability for your use of this information. Suspected Ectopic Pregnancy: Care Instructions  Your Care Instructions    Your doctor thinks you may have an ectopic pregnancy. This means that a fertilized egg has attached to a place outside the uterus. In most of these cases, the egg grows in a fallopian tube. This is also called a tubal pregnancy. In rare cases, the egg grows in an ovary or another place in the belly. An ectopic pregnancy cannot develop normally. It can be painful and very dangerous. In some cases, the ectopic pregnancy ends and the body absorbs it over time. If so, treatment is not needed. Your doctor is sending you home to watch for belly pain or bleeding. Call your doctor right away if you have any new or increased pain or bleeding. If you have other symptoms, such as shoulder pain, dizziness, lightheadedness, or fainting, call your doctor right away. These could be signs of internal bleeding. You also may need to come back for more tests. If an ectopic pregnancy does not end on its own, the only treatment is medicine or surgery to end the pregnancy. An ectopic pregnancy can be very upsetting. But you should not blame yourself. You could not have done anything to prevent it. The doctor has checked you carefully. But problems can develop later.  If you notice any problems or new symptoms, get medical treatment right away. Follow-up care is a key part of your treatment and safety. Be sure to make and go to all appointments, and call your doctor if you are having problems. It's also a good idea to know your test results and keep a list of the medicines you take. How can you care for yourself at home? · Rest when you feel tired. You may be more tired than normal for a few weeks. · If you are treated with methotrexate:  ¨ Your doctor will let you know if you can take over-the-counter pain medicine, such as acetaminophen (Tylenol), ibuprofen (Advil, Motrin), or naproxen (Aleve). Read and follow all instructions on the label. ¨ Do not take two or more pain medicines at the same time unless the doctor told you to. Many pain medicines have acetaminophen, which is Tylenol. Too much acetaminophen (Tylenol) can be harmful. ¨ Do not drink alcohol. ¨ Do not take vitamins that contain folic acid, such as prenatal vitamins. · Use pads instead of tampons until your doctor says it is okay. · It may help to talk with family, friends, or a counselor if you are having trouble dealing with the loss of your pregnancy. If you feel sad or depressed for longer than 2 weeks, talk to a counselor or your doctor. · Do not have sex until your doctor says it is okay. · Talk with your doctor about any future pregnancy plans. When should you call for help? Call 911 anytime you think you may need emergency care. For example, call if:  ? · You have sudden, severe pain in your belly or pelvis. ? · You passed out (lost consciousness). ? · You have severe vaginal bleeding. ?Call your doctor now or seek immediate medical care if:  ? · You are dizzy or lightheaded, or you feel like you may faint. ? · You have new or increased pain in your belly or pelvis. ? · Your vaginal bleeding is getting worse. ? · You have increased pain in the vaginal area. ? · You have new pain in your shoulder. ? · You have a fever. ? Watch closely for changes in your health, and be sure to contact your doctor if:  ? · You have new or worse vaginal discharge. ? · You do not get better as expected. Where can you learn more? Go to http://tamy-keerthi.info/. Enter Y417 in the search box to learn more about \"Suspected Ectopic Pregnancy: Care Instructions. \"  Current as of: March 16, 2017  Content Version: 11.4  © 6445-8907 [employer], [employer]. Care instructions adapted under license by [employer] (which disclaims liability or warranty for this information). If you have questions about a medical condition or this instruction, always ask your healthcare professional. [address] any warranty or liability for your use of this information.

## 2017-11-02 NOTE — PROGRESS NOTES
SUBJ: Ms. Avinash Montes De Oca is a 23 y.o. y/o female, , who presents today for     Chief Complaint   Patient presents with    Pelvic Pain       Her LMP was Patient's last menstrual period was 2017. Ms. Ivanna Lawson c/o pelvic pain and hx of vaginal bleeding. She took a home pregnancy test 4 days ago with positive result. By LMP, patient 4w 5d GA. Pain began yesterday in suprapubic region, rates 8/10, dull per pt report. Patient began to have vaginal spotting yesterday, not enough to fill pad. Went to [facility] ED to be evaluated. Patient states that pelvic exam so difficult that she was in tears. Bedside US performed showing \"probable first trimester pregnancy\". Patient dx'ed with cervicitis and treated prophylactically with Azithromycin and Rocephin. Also given rx for flagyl; however, patient did not  this rx yet. She reports that VB has resolved, but she continues to have intense pelvic pain. No alleviating or aggravating factors. Denies N/V or fever/chills. She is not taking any current medications. Denies past pregnancy hx, no GYN surgical hx noted. She has started taking prenatal vitamins. Spouse is active duty [facility] access hospital and is currently away until tomorrow. Past Medical History:   Diagnosis Date    ADHD (attention deficit hyperactivity disorder)       History reviewed. No pertinent surgical history.   Family History   Problem Relation Age of Onset    Cancer Mother      skin    Cancer Father      prostate    Heart Attack Father     Ovarian Cancer Paternal Grandmother      Social History     Social History    Marital status:      Spouse name: N/A    Number of children: N/A    Years of education: N/A     Social History Main Topics    Smoking status: Never Smoker    Smokeless tobacco: Never Used    Alcohol use 1.8 oz/week     3 Cans of beer per week      Comment: per week    Drug use: No    Sexual activity: Yes     Partners: Male     Birth control/ protection: None Comment: happy with her      Other Topics Concern    None     Social History Narrative         PE:   Visit Vitals    /68 (BP 1 Location: Right arm, BP Patient Position: Sitting)    Pulse 78    LMP 09/30/2017       Pt is a well developed female who is alert and oriented x 3. CVS exam: normal rate, regular rhythm, normal S1, S2, no murmurs, rubs, clicks or gallops. Lungs: clear bilaterally to auscultation, no wheezing, rhonci or rales  Pelvic exam: VULVA: normal appearing vulva with no masses, tenderness or lesions, VAGINA: normal appearing vagina with normal color and discharge, no blood, no lesions, CERVIX: normal appearing cervix without discharge or lesions, no blood, DNA probe for chlamydia and GC obtained, cervical motion tenderness absent, UTERUS: tenderness on palpation, enlarged to 4 week's size, ADNEXA: normal adnexa in size, nontender and no masses. Assessment/Plan:       1. Pelvic pain in female  STAT US to r/o ectopic pregnancy  Given ectopic pregnancy and SAB precautions and when to visit hospital  - [address]; Future  - BETA HCG, QT; Future  - CT/NG/T.VAGINALIS AMPLIFICATION; Future  - BETA HCG, QT  - CT/NG/T.VAGINALIS AMPLIFICATION    2. Secondary amenorrhea  Will collect beta and repeat in 48 hours  - BETA HCG, QT; Future  - BETA HCG, QT    3. H/O vaginal bleeding    - US UTS TRANSVAGINAL OB;  Future    RTC in 2 days or prn  She verbalizes understanding of all teaching

## 2017-11-02 NOTE — LETTER
NOTIFICATION RETURN TO WORK / SCHOOL 
 
11/2/2017 3:20 PM 
 
Ms. Gene Choi [address] [place] [id number] To Whom It May Concern: 
 
Gene Choi is currently under the care of [address] [place] [address]. She was seen in the office on 11/2/2017. If there are questions or concerns please have the patient contact our office at [phone]. Sincerely, Jammie Nava CNM

## 2017-11-04 LAB
C TRACH RRNA SPEC QL NAA+PROBE: NEGATIVE
HCG INTACT+B SERPL-ACNC: NORMAL MIU/ML
N GONORRHOEA RRNA SPEC QL NAA+PROBE: NEGATIVE
T VAGINALIS RRNA SPEC QL NAA+PROBE: NEGATIVE

## 2017-11-06 ENCOUNTER — TELEPHONE (OUTPATIENT)
Dept: OBGYN CLINIC | Age: 22
End: 2017-11-06

## 2017-11-06 ENCOUNTER — OFFICE VISIT (OUTPATIENT)
Dept: OBGYN CLINIC | Age: 22
End: 2017-11-06

## 2017-11-06 ENCOUNTER — HOSPITAL ENCOUNTER (OUTPATIENT)
Dept: LAB | Age: 22
Discharge: HOME | End: 2017-11-06
Payer: OTHER GOVERNMENT

## 2017-11-06 VITALS
SYSTOLIC BLOOD PRESSURE: 115 MMHG | HEIGHT: 63 IN | DIASTOLIC BLOOD PRESSURE: 66 MMHG | WEIGHT: 160 LBS | BODY MASS INDEX: 28.35 KG/M2 | HEART RATE: 72 BPM

## 2017-11-06 DIAGNOSIS — N91.1 SECONDARY AMENORRHEA: ICD-10-CM

## 2017-11-06 DIAGNOSIS — N91.1 SECONDARY AMENORRHEA: Primary | ICD-10-CM

## 2017-11-06 LAB — HCG SERPL-ACNC: ABNORMAL MIU/ML (ref 0–10)

## 2017-11-06 PROCEDURE — 84702 CHORIONIC GONADOTROPIN TEST: CPT | Performed by: ADVANCED PRACTICE MIDWIFE

## 2017-11-06 NOTE — TELEPHONE ENCOUNTER
----- Message from Katja Carr CNM sent at 11/6/2017 12:29 PM EST -----  Please notify patient of normal US report at this time. Beta HCG pending.

## 2017-12-19 ENCOUNTER — HOSPITAL ENCOUNTER (OUTPATIENT)
Dept: LAB | Age: 22
Discharge: HOME | End: 2017-12-19
Payer: OTHER GOVERNMENT

## 2017-12-19 ENCOUNTER — ROUTINE PRENATAL (OUTPATIENT)
Dept: OBGYN CLINIC | Age: 22
End: 2017-12-19

## 2017-12-19 VITALS
BODY MASS INDEX: 27.57 KG/M2 | DIASTOLIC BLOOD PRESSURE: 60 MMHG | TEMPERATURE: 98.5 F | WEIGHT: 155.6 LBS | RESPIRATION RATE: 16 BRPM | HEART RATE: 77 BPM | HEIGHT: 63 IN | SYSTOLIC BLOOD PRESSURE: 111 MMHG | OXYGEN SATURATION: 100 %

## 2017-12-19 DIAGNOSIS — Z34.02 ENCOUNTER FOR SUPERVISION OF NORMAL FIRST PREGNANCY IN SECOND TRIMESTER: ICD-10-CM

## 2017-12-19 DIAGNOSIS — Z34.02 ENCOUNTER FOR SUPERVISION OF NORMAL FIRST PREGNANCY IN SECOND TRIMESTER: Primary | ICD-10-CM

## 2017-12-19 LAB
ABO + RH BLD: NORMAL
BASOPHILS # BLD: 0 K/UL (ref 0–0.06)
BASOPHILS NFR BLD: 0 % (ref 0–2)
BLOOD GROUP ANTIBODIES SERPL: NORMAL
DIFFERENTIAL METHOD BLD: ABNORMAL
EOSINOPHIL # BLD: 0.1 K/UL (ref 0–0.4)
EOSINOPHIL NFR BLD: 1 % (ref 0–5)
ERYTHROCYTE [DISTWIDTH] IN BLOOD BY AUTOMATED COUNT: 13.5 % (ref 11.6–14.5)
HCT VFR BLD AUTO: 32.4 % (ref 35–45)
HGB BLD-MCNC: 11.5 G/DL (ref 12–16)
LYMPHOCYTES # BLD: 1.5 K/UL (ref 0.9–3.6)
LYMPHOCYTES NFR BLD: 18 % (ref 21–52)
MCH RBC QN AUTO: 33.1 PG (ref 24–34)
MCHC RBC AUTO-ENTMCNC: 35.5 G/DL (ref 31–37)
MCV RBC AUTO: 93.4 FL (ref 74–97)
MONOCYTES # BLD: 0.5 K/UL (ref 0.05–1.2)
MONOCYTES NFR BLD: 6 % (ref 3–10)
NEUTS SEG # BLD: 6.3 K/UL (ref 1.8–8)
NEUTS SEG NFR BLD: 75 % (ref 40–73)
PLATELET # BLD AUTO: 265 K/UL (ref 135–420)
PMV BLD AUTO: 10 FL (ref 9.2–11.8)
RBC # BLD AUTO: 3.47 M/UL (ref 4.2–5.3)
RPR SER QL: NONREACTIVE
RUBV IGG SER-IMP: NORMAL
SPECIMEN EXP DATE BLD: NORMAL
WBC # BLD AUTO: 8.5 K/UL (ref 4.6–13.2)

## 2017-12-19 PROCEDURE — 85025 COMPLETE CBC W/AUTO DIFF WBC: CPT | Performed by: ADVANCED PRACTICE MIDWIFE

## 2017-12-19 PROCEDURE — 81220 CFTR GENE COM VARIANTS: CPT | Performed by: ADVANCED PRACTICE MIDWIFE

## 2017-12-19 PROCEDURE — 83021 HEMOGLOBIN CHROMOTOGRAPHY: CPT | Performed by: ADVANCED PRACTICE MIDWIFE

## 2017-12-19 PROCEDURE — 86592 SYPHILIS TEST NON-TREP QUAL: CPT | Performed by: ADVANCED PRACTICE MIDWIFE

## 2017-12-19 PROCEDURE — 87389 HIV-1 AG W/HIV-1&-2 AB AG IA: CPT | Performed by: ADVANCED PRACTICE MIDWIFE

## 2017-12-19 PROCEDURE — 86900 BLOOD TYPING SEROLOGIC ABO: CPT | Performed by: ADVANCED PRACTICE MIDWIFE

## 2017-12-19 PROCEDURE — 86762 RUBELLA ANTIBODY: CPT | Performed by: ADVANCED PRACTICE MIDWIFE

## 2017-12-20 LAB
DEPRECATED HGB OTHER BLD-IMP: 0 %
HGB A MFR BLD: 97.4 % (ref 96.4–98.8)
HGB A2 MFR BLD COLUMN CHROM: 2.6 % (ref 1.8–3.2)
HGB C MFR BLD: 0 %
HGB F MFR BLD: 0 % (ref 0–2)
HGB FRACT BLD-IMP: NORMAL
HGB S BLD QL SOLY: NEGATIVE
HGB S MFR BLD: 0 %
HIV 1+2 AB+HIV1 P24 AG SERPL QL IA: NONREACTIVE
HIV12 RESULT COMMENT, HHIVC: NORMAL

## 2017-12-20 NOTE — PATIENT INSTRUCTIONS

## 2017-12-20 NOTE — PROGRESS NOTES
PRENATAL INTAKE SUMMARY    Ms. Schuyler Obrien presents today for her first prenatal visit. Patient's last menstrual period was 2017 (within days). She is dated by 5wk US done at [facility]. OB History    Para Term  AB Living   1 0 0 0 0 0   SAB TAB Ectopic Molar Multiple Live Births   0 0 0  0       # Outcome Date GA Lbr Carlos Alberto/2nd Weight Sex Delivery Anes PTL Lv   1 Current               Obstetric Comments      Patient's last menstrual period was 2017. Periods regular, last 4-5 days, flow medium, moderate dysmenorrhea   History of sexually transmitted infections never   Last pap 1-2 years ago       I have reviewed the patient's medical, obstetrical, social, and family histories, medications, and available lab results. Past Medical History:   Diagnosis Date    ADHD (attention deficit hyperactivity disorder)      History reviewed. No pertinent surgical history.   Family History   Problem Relation Age of Onset    Cancer Mother      skin    Cancer Father      prostate    Heart Attack Father     Ovarian Cancer Paternal Grandmother      Social History     Social History    Marital status:      Spouse name: N/A    Number of children: N/A    Years of education: N/A     Social History Main Topics    Smoking status: Never Smoker    Smokeless tobacco: Never Used    Alcohol use 1.8 oz/week     3 Cans of beer per week      Comment: per week    Drug use: No    Sexual activity: Yes     Partners: Male     Birth control/ protection: None      Comment: happy with her      Other Topics Concern    None     Social History Narrative       Subjective:   She has no unusual complaints    Objective:   Initial Physical Exam (New OB)    GENERAL APPEARANCE: alert, well appearing, in no apparent distress, oriented to person, place and time  THYROID: no thyromegaly or masses present  BREASTS: no masses noted, no significant tenderness, no palpable axillary nodes, no skin changes  LUNGS: clear to auscultation, no wheezes, rales or rhonchi, symmetric air entry  HEART: regular rate and rhythm, no murmurs  ABDOMEN: soft, nontender, nondistended, no abnormal masses, no epigastric pain and FHT present  SKIN: normal coloration and turgor, no rashes  PELVIC EXAM: pelvic exam completed at prior visit - CT/GC negative    Assessment/Plan:   Normal pregnancy  Routine Prenatal care  Continue prenatal vitamins po daily  Prenatal labs collected  Reviewed s/s of when to visit the hospital  CT/GC/trich - neg  RTC in 4 wks    ICD-10-CM ICD-9-CM    1.  Encounter for supervision of normal first pregnancy in second trimester Z34.02 V22.0 PNV 17/APDG PS,HEME/FOLIC/DHA (PRENATAL MV & MIN PO)      RPR      RUBELLA AB, IGG      HIV 1/2 AG/AB, 4TH GENERATION,W RFLX CONFIRM      CBC WITH AUTOMATED DIFF      HEMOGLOBIN FRACTIONATION      CYSTIC FIBROSIS MUTATION 97      TYPE & SCREEN

## 2017-12-26 LAB
CFTR MUT ANL BLD/T: NORMAL
INTERPRETATION, 450021: NORMAL

## 2018-01-16 ENCOUNTER — HOSPITAL ENCOUNTER (OUTPATIENT)
Dept: LAB | Age: 23
Discharge: HOME | End: 2018-01-16
Payer: OTHER GOVERNMENT

## 2018-01-16 ENCOUNTER — ROUTINE PRENATAL (OUTPATIENT)
Dept: OBGYN CLINIC | Age: 23
End: 2018-01-16

## 2018-01-16 VITALS
RESPIRATION RATE: 16 BRPM | WEIGHT: 159.6 LBS | DIASTOLIC BLOOD PRESSURE: 64 MMHG | SYSTOLIC BLOOD PRESSURE: 109 MMHG | BODY MASS INDEX: 28.28 KG/M2 | HEART RATE: 69 BPM | HEIGHT: 63 IN | TEMPERATURE: 97.2 F | OXYGEN SATURATION: 100 %

## 2018-01-16 DIAGNOSIS — Z34.02 ENCOUNTER FOR SUPERVISION OF NORMAL FIRST PREGNANCY IN SECOND TRIMESTER: ICD-10-CM

## 2018-01-16 DIAGNOSIS — Z34.02 ENCOUNTER FOR SUPERVISION OF NORMAL FIRST PREGNANCY IN SECOND TRIMESTER: Primary | ICD-10-CM

## 2018-01-16 PROCEDURE — 82105 ALPHA-FETOPROTEIN SERUM: CPT | Performed by: ADVANCED PRACTICE MIDWIFE

## 2018-01-16 NOTE — PROGRESS NOTES
16w4d   Presents to the office for a routine prenatal visit. No OB complaints today of VB, LOF or pelvic pain  Reviewed prenatal labs  Rh +  Spouse in [address] [place] [address] Street  Moving to  soon, would still like to deliver at [facility]  Discussed registering for hospital tour  Counseled on weight gain in pregnancy  She verbalizes understanding of all teaching  See flow sheet  AFP Quad collected  Morph US ordered  A/P: Normal prenatal visit.   F/U in 4 weeks

## 2018-01-16 NOTE — PATIENT INSTRUCTIONS
Weeks 14 to 18 of Your Pregnancy: Care Instructions  Your Care Instructions    During this time, you may start to \"show,\" so that you look pregnant to people around you. You may also notice some changes in your skin, such as itchy spots on your palms or acne on your face. Your baby is now able to pass urine, and your baby's first stool (meconium) is starting to collect in his or her intestines. Hair is also beginning to grow on your baby's head. At your next visit, between weeks 18 and 20, your doctor may do an ultrasound test. The test allows your doctor to check for certain problems. Your doctor can also tell the sex of your baby. This is a good time to think about whether you want to know whether your baby is a boy or a girl. Talk to your doctor about getting a flu shot to help keep you healthy during your pregnancy. As your pregnancy moves along, it is common to worry or feel anxious. Your body is changing a lot. And you are thinking about giving birth, the health of your baby, and becoming a parent. You can learn to cope with any anxiety and stress you feel. Follow-up care is a key part of your treatment and safety. Be sure to make and go to all appointments, and call your doctor if you are having problems. It's also a good idea to know your test results and keep a list of the medicines you take. How can you care for yourself at home? ?Reduce stress  ? · Ask for help with cooking and housekeeping. ? · Figure out who or what causes your stress. Avoid these people or situations as much as possible. ? · Relax every day. Taking 10- to 15-minute breaks can make a big difference. Take a walk, listen to music, or take a warm bath. ? · Learn relaxation techniques at prenatal or yoga class. Or buy a relaxation tape. ? · List your fears about having a baby and becoming a parent. Share the list with someone you trust. Decide which worries are really small, and try to let them go. Exercise  ?  · If you did not exercise much before pregnancy, start slowly. Walking is best. Corazon Mouse yourself, and do a little more every day. ? · Brisk walking, easy jogging, low-impact aerobics, water aerobics, and yoga are good choices. Some sports, such as scuba diving, horseback riding, downhill skiing, gymnastics, and water skiing, are not a good idea. ? · Try to do at least 2½ hours a week of moderate exercise, such as a fast walk. One way to do this is to be active 30 minutes a day, at least 5 days a week. It's fine to be active in blocks of 10 minutes or more throughout your day and week. ? · Wear loose clothing. And wear shoes and a bra that provide good support. ? · Warm up and cool down to start and finish your exercise. ? · If you want to use weights, be sure to use light weights. They reduce stress on your joints. ?Stay at the best weight for you  ? · Experts recommend that you gain about 1 pound a month during the first 3 months of your pregnancy. ? · Experts recommend that you gain about 1 pound a week during your last 6 months of pregnancy, for a total weight gain of 25 to 35 pounds. ? · If you are underweight, you will need to gain more weight (about 28 to 40 pounds). ? · If you are overweight, you may not need to gain as much weight (about 15 to 25 pounds). ? · If you are gaining weight too fast, use common sense. Exercise every day, and limit sweets, fast foods, and fats. Choose lean meats, fruits, and vegetables. ? · If you are having twins or more, your doctor may refer you to a dietitian. Where can you learn more? Go to http://tamy-keerthi.info/. Enter Z544 in the search box to learn more about \"Weeks 14 to 18 of Your Pregnancy: Care Instructions. \"  Current as of: March 16, 2017  Content Version: 11.4  © 2390-1722 [employer]. Care instructions adapted under license by [employer] (which disclaims liability or warranty for this information).  If you have questions about a medical condition or this instruction, always ask your healthcare professional. [place] [address] any warranty or liability for your use of this information.

## 2018-01-20 LAB
2ND TRIMESTER 4 SCREEN SERPL-IMP: NORMAL
2ND TRIMESTER 4 SCREEN SERPL-IMP: NORMAL
AFP ADJ MOM SERPL: 1.07
AFP SERPL-MCNC: 37.2 NG/ML
AGE AT DELIVERY: 25.1 YEARS
COMMENTS, 018014: NORMAL
FET TS 18 RISK FROM MAT AGE: NORMAL
FET TS 21 RISK FROM MAT AGE: 1026
GA METHOD: NORMAL
GA: 16.7 WEEKS
HCG ADJ MOM SERPL: 1.29
HCG SERPL-ACNC: NORMAL MIU/ML
IDDM PATIENT QL: NO
INHIBIN A ADJ MOM SERPL: 1.02
INHIBIN A SERPL-MCNC: 172.47 PG/ML
MULTIPLE PREGNANCY: NO
NEURAL TUBE DEFECT RISK FETUS: 9862 %
RESULTS, 017389: NORMAL
TS 18 RISK FETUS: NORMAL
TS 21 RISK FETUS: NORMAL
U ESTRIOL ADJ MOM SERPL: 1.43
U ESTRIOL SERPL-MCNC: 1.38 NG/ML

## 2022-03-18 PROBLEM — Z13.31 SCREENING FOR DEPRESSION: Status: ACTIVE | Noted: 2017-05-01

## 2022-03-19 PROBLEM — F90.9 ATTENTION DEFICIT HYPERACTIVITY DISORDER (ADHD): Status: ACTIVE | Noted: 2017-05-01

## 2023-05-08 ENCOUNTER — HOSPITAL ENCOUNTER (OUTPATIENT)
Facility: HOSPITAL | Age: 28
Setting detail: RECURRING SERIES
Discharge: HOME | End: 2023-05-11
Payer: OTHER GOVERNMENT

## 2023-05-08 PROCEDURE — 97535 SELF CARE MNGMENT TRAINING: CPT

## 2023-05-08 PROCEDURE — 97161 PT EVAL LOW COMPLEX 20 MIN: CPT

## 2023-05-08 PROCEDURE — 97110 THERAPEUTIC EXERCISES: CPT

## 2023-05-08 NOTE — PROGRESS NOTES
[address] [facility], [address] UE:[phone] Fx: [phone]    PLAN OF CARE/ Statement of Necessity for Physical Therapy Services           Patient name: Tara Paniagua Start of Care: 2023   Referral source: Laura North DO : 1995    Medical Diagnosis: Right knee pain [M25.561]       Onset Date:2023    Treatment Diagnosis: M25.561  RIGHT KNEE PAIN                                      Prior Hospitalization: see medical history Provider#: [id number]   Medications: Verified on Patient Summary List     Comorbidities: latex allergy, depression  Prior Level of Function: functionally I with all activities    The Plan of Care and following information is based on the information from the initial evaluation. Assessment / key information:  24 y/o female presents with c/o right knee pain that started 4 days after running a half marathon. The pt reports pain with running, bending and stairs. Most of the pain is in the supra and infrapatellar region. The pt demonstrates + increase valgus with step down and squatting. Decreased strength is noted of B glutes with the right being weaker. + quad and hamstring tightness is noted and + tenderness to palpation over the right distal quad, primarily vastus medialis. The pt will benefit from PT to address the aforementioned impairments.      Evaluation Complexity:  History:  MEDIUM  Complexity : 1-2 comorbidities / personal factors will impact the outcome/ POC ; Examination:  MEDIUM Complexity : 3 Standardized tests and measures addressin body structure, function, activity limitation and / or participation in recreation  ;Presentation:  LOW Complexity : Stable, uncomplicated  ;Clinical Decision Making:  MEDIUM Complexity : FOTO score of 26-74 FOTO score = an established functional score where 100 = no disability  Overall Complexity Rating: LOW   Problem List: pain affecting function,

## 2023-05-08 NOTE — PROGRESS NOTES
PHYSICAL / OCCUPATIONAL THERAPY - DAILY TREATMENT NOTE (updated )    Patient Name: Abdirashid Cesar    Date: 2023    : 1995  Insurance: Payor: [employer] / Plan: [employer] / Product Type: *No Product type* /      Patient  verified Yes     Visit #   Current / Total 1 5   Time   In / Out 2:30 3:08   Pain   In / Out 4 4   Subjective Functional Status/Changes:     Changes to:  Meds, Allergies, Med Hx, Sx Hx? If yes, update Summary List no       TREATMENT AREA =  Right knee pain [M25.561]    OBJECTIVE      Therapeutic Procedures: Tx Min Billable or 1:1 Min (if diff from Tx Min) Procedure, Rationale, Specifics   10  00911 Therapeutic Exercise (timed):  increase ROM, strength, coordination, balance, and proprioception to improve patient's ability to progress to PLOF and address remaining functional goals. (see flow sheet as applicable)     Details if applicable:       8  26090 Self Care/Home Management (timed):  improve patient knowledge and understanding of pain reducing techniques, diagnosis/prognosis, and physical therapy expectations, procedures and progression  to improve patient's ability to progress to PLOF and address remaining functional goals.   (see flow sheet as applicable)     Details if applicable:                    25  MC BC Totals Reminder: bill using total billable min of TIMED therapeutic procedures (example: do not include dry needle or estim unattended, both untimed codes, in totals to left)  8-22 min = 1 unit; 23-37 min = 2 units; 38-52 min = 3 units; 53-67 min = 4 units; 68-82 min = 5 units   Total Total     TOTAL TREATMENT TIME:        38     [x]  Patient Education billed concurrently with other procedures   [x] Review HEP    [] Progressed/Changed HEP, detail:    [] Other detail:       Objective Information/Functional Measures/Assessment         Patient will continue to benefit from skilled PT / OT services to modify and progress therapeutic interventions, analyze and

## 2023-06-05 ENCOUNTER — HOSPITAL ENCOUNTER (OUTPATIENT)
Facility: HOSPITAL | Age: 28
Setting detail: RECURRING SERIES
Discharge: HOME | End: 2023-06-08
Payer: OTHER GOVERNMENT

## 2023-06-05 PROCEDURE — 97110 THERAPEUTIC EXERCISES: CPT

## 2023-06-05 PROCEDURE — 97112 NEUROMUSCULAR REEDUCATION: CPT

## 2023-06-05 PROCEDURE — 97140 MANUAL THERAPY 1/> REGIONS: CPT

## 2023-06-05 NOTE — PROGRESS NOTES
PHYSICAL / OCCUPATIONAL THERAPY - DAILY TREATMENT NOTE (updated )    Patient Name: Messi Carpenter    Date: 2023    : 1995  Insurance: Payor: [employer] / Plan: [employer] / Product Type: *No Product type* /      Patient  verified Yes     Visit #   Current / Total 2 5   Time   In / Out 1:10 2:05   Pain   In / Out 3 2   Subjective Functional Status/Changes: The pt reports she has be stretching more and trying HEP some. Changes to:  Meds, Allergies, Med Hx, Sx Hx? If yes, update Summary List yes       TREATMENT AREA =  Right knee pain [M25.561]    OBJECTIVE    Modalities Rationale:     decrease inflammation and decrease pain to improve patient's ability to progress to PLOF and address remaining functional goals. min [] Estim Unattended, type/location:                                      []  w/ice    []  w/heat    min [] Estim Attended, type/location:                                     []  w/US     []  w/ice    []  w/heat    []  TENS insruct      min []  Mechanical Traction: type/lbs                   []  pro   []  sup   []  int   []  cont    []  before manual    []  after manual    min []  Ultrasound, settings/location:      min []  Iontophoresis w/ dexamethasone, location:                                               []  take home patch       []  in clinic   10     min  unbilled [x]  Ice     []  Heat    location/position: Right knee  reclined    min []  Paraffin,  details:     min []  Vasopneumatic Device, press/temp:     min []  Clabe Divine / Loletha Messenger: If using vaso (only need to measure limb vaso being performed on)      pre-treatment girth :       post-treatment girth :       measured at (landmark location) :      min []  Other:    Skin assessment post-treatment (if applicable):    []  intact    []  redness- no adverse reaction                 []redness - adverse reaction:         Therapeutic Procedures:   Tx Min Billable or 1:1 Min (if diff from [place]) Procedure, Rationale,

## 2023-07-03 ENCOUNTER — APPOINTMENT (OUTPATIENT)
Facility: HOSPITAL | Age: 28
End: 2023-07-03
Payer: OTHER GOVERNMENT

## 2023-07-10 ENCOUNTER — HOSPITAL ENCOUNTER (OUTPATIENT)
Facility: HOSPITAL | Age: 28
Setting detail: RECURRING SERIES
Discharge: HOME | End: 2023-07-13
Payer: OTHER GOVERNMENT

## 2023-07-10 PROCEDURE — 97140 MANUAL THERAPY 1/> REGIONS: CPT

## 2023-07-10 PROCEDURE — 97530 THERAPEUTIC ACTIVITIES: CPT

## 2023-07-10 PROCEDURE — 97110 THERAPEUTIC EXERCISES: CPT

## 2023-07-10 NOTE — PROGRESS NOTES
PHYSICAL / OCCUPATIONAL THERAPY - DAILY TREATMENT NOTE (updated )    Patient Name: Humberto Belcher    Date: 7/10/2023    : 1995  Insurance: Payor:  EAST / Plan: [employer] EAST / Product Type: *No Product type* /      Patient  verified Yes     Visit #   Current / Total 1 12   Time   In / Out 3:11 3:52   Pain   In / Out 3 3-4   Subjective Functional Status/Changes: Pt states she had to go out of town due to a family emergency. She reports she tried running 1 mile but had increased pain. Changes to:  Meds, Allergies, Med Hx, Sx Hx? If yes, update Summary List yes       TREATMENT AREA =  Right knee pain [M25.561]    OBJECTIVE  Therapeutic Procedures: Tx Min Billable or 1:1 Min (if diff from Tx Min) Procedure, Rationale, Specifics   8  04639 Therapeutic Exercise (timed):  increase ROM, strength, coordination, balance, and proprioception to improve patient's ability to progress to PLOF and address remaining functional goals. (see flow sheet as applicable)    Details if applicable:     16  61919 Therapeutic Activity (timed):  use of dynamic activities replicating functional movements to increase ROM, strength, coordination, balance, and proprioception in order to improve patient's ability to progress to PLOF and address remaining functional goals. (see flow sheet as applicable)     Details if applicable:  goal assess, FOTO     5  [id number] Neuromuscular Re-Education (timed):  improve balance, coordination, kinesthetic sense, posture, core stability and proprioception to improve patient's ability to develop conscious control of individual muscles and awareness of position of extremities in order to progress to PLOF and address remaining functional goals. (see flow sheet as applicable)     Details if applicable:     12  86259 Manual Therapy (timed):  decrease pain, increase ROM, and increase tissue extensibility to improve patient's ability to progress to PLOF and address remaining functional goals.

## 2023-07-10 NOTE — PROGRESS NOTES
[address] [facility] [address] [facility] - Ph: [phone]   Fx: [phone]    PHYSICAL THERAPY PROGRESS NOTE      Patient name: Neema Murdock Start of Care: 2023   Referral source: Pedro Conley DO : 1995               Medical Diagnosis: Right knee pain [M25.561]        Onset Date:2023               Treatment Diagnosis: M25.561  RIGHT KNEE PAIN                                      Prior Hospitalization: see medical history Provider#: [id number]   Medications: Verified on Patient Summary List      Comorbidities: latex allergy, depression  Prior Level of Function: functionally I with all activities  Visits from Start of Care: 3    Missed Visits: 1    Summary of Care/ Key Functional Changes:   Functional gains: was getting better prior to going out of town   Functional deficits: unable to run/jog without pain, pain with twisting motions, squatting  At best pain: 3/10  At worst pain: 7/10  Short Term Goals: To be accomplished in 2 weeks  The pt will be I and compliant with HEP              IE- issued HEP               Current: MET, reports compliance 7/10/2023  Long Term Goals: To be accomplished in 4 weeks  Improve right glute med MMT to 4+/5 for improved ability for functional tasks           IE- 3+/5  Current: not met, 3+/5 7/10/2023  2. The pt will report at least 50% improvement for improved running tolerance              IE- 0              Current: not met, 15-20% improvement 7/10/2023  3. Improve FOTO score to 83 to improved ability for daily activities              IE- 65              Current: not met, 56 points 7/10/2023  4. Improve Glute max MMT to 4+/5 for improved ability for functional tasks              IE- 3+/5              Current: not met, 3+/5 with pain in the posterior knee 7/10/2023    Goals/Measure of Progress:  To be achieved in 1-2 for 12 sessions:  Improve right glute med MMT to 4+/5 for improved

## 2023-07-13 ENCOUNTER — HOSPITAL ENCOUNTER (OUTPATIENT)
Facility: HOSPITAL | Age: 28
Setting detail: RECURRING SERIES
Discharge: HOME | End: 2023-07-16
Payer: OTHER GOVERNMENT

## 2023-07-13 PROCEDURE — 97110 THERAPEUTIC EXERCISES: CPT

## 2023-07-13 PROCEDURE — 97112 NEUROMUSCULAR REEDUCATION: CPT

## 2023-07-13 PROCEDURE — 97140 MANUAL THERAPY 1/> REGIONS: CPT

## 2023-07-17 ENCOUNTER — HOSPITAL ENCOUNTER (OUTPATIENT)
Facility: HOSPITAL | Age: 28
Setting detail: RECURRING SERIES
Discharge: HOME | End: 2023-07-20
Payer: OTHER GOVERNMENT

## 2023-07-17 PROCEDURE — 97110 THERAPEUTIC EXERCISES: CPT

## 2023-07-17 PROCEDURE — 97140 MANUAL THERAPY 1/> REGIONS: CPT

## 2023-07-17 PROCEDURE — 97112 NEUROMUSCULAR REEDUCATION: CPT

## 2023-07-17 NOTE — PROGRESS NOTES
PHYSICAL / OCCUPATIONAL THERAPY - DAILY TREATMENT NOTE (updated )    Patient Name: Ankush Schmidt    Date: 2023    : 1995  Insurance: Payor: [employer] EAST / Plan: [employer] EAST / Product Type: *No Product type* /      Patient  verified Yes     Visit #   Current / Total 5 13   Time   In / Out 3:10 3:50   Pain   In / Out 2/10 4/10   Subjective Functional Status/Changes: The patient reports that her knee is doing well and she feels it is improving. Changes to: Allergies, Med Hx, Sx Hx?   no       TREATMENT AREA =  Right knee pain [M25.561]    OBJECTIVE  Therapeutic Procedures: Tx Min Billable or 1:1 Min (if diff from Tx Min) Procedure, Rationale, Specifics   20  40277 Therapeutic Exercise (timed):  increase ROM, strength, coordination, balance, and proprioception to improve patient's ability to progress to PLOF and address remaining functional goals. (see flow sheet as applicable)     Details if applicable:       12  52833 Neuromuscular Re-Education (timed):  improve balance, coordination, kinesthetic sense, posture, core stability and proprioception to improve patient's ability to develop conscious control of individual muscles and awareness of position of extremities in order to progress to PLOF and address remaining functional goals. (see flow sheet as applicable)     Details if applicable:     8  22575 Manual Therapy (timed):  decrease pain, increase ROM, and increase tissue extensibility to improve patient's ability to progress to PLOF and address remaining functional goals. The manual therapy interventions were performed at a separate and distinct time from the therapeutic activities interventions .  (see flow sheet as applicable)     Details if applicable:   [place] with GT 5 to the right medial quad and TPR to the same with pt reclined   40  MC BC Totals Reminder: bill using total billable min of TIMED therapeutic procedures (example: do not include dry needle or estim unattended, both

## 2023-07-19 ENCOUNTER — HOSPITAL ENCOUNTER (OUTPATIENT)
Facility: HOSPITAL | Age: 28
Setting detail: RECURRING SERIES
Discharge: HOME | End: 2023-07-22
Payer: OTHER GOVERNMENT

## 2023-07-19 PROCEDURE — 97140 MANUAL THERAPY 1/> REGIONS: CPT

## 2023-07-19 PROCEDURE — 97110 THERAPEUTIC EXERCISES: CPT

## 2023-07-19 PROCEDURE — 97112 NEUROMUSCULAR REEDUCATION: CPT

## 2023-07-19 NOTE — PROGRESS NOTES
PHYSICAL / OCCUPATIONAL THERAPY - DAILY TREATMENT NOTE (updated )    Patient Name: Kelly Nunez    Date: 2023    : 1995  Insurance: Payor:  EAST / Plan: [employer] EAST / Product Type: *No Product type* /      Patient  verified Yes     Visit #   Current / Total 6 13   Time   In / Out 151 238   Pain   In / Out 4 4   Subjective Functional Status/Changes: Pt states she worked out lower body yesterday and she has no pain but her knee is sore. Changes to: Allergies, Med Hx, Sx Hx?   no       TREATMENT AREA =  Right knee pain [M25.561]    OBJECTIVE    Therapeutic Procedures: Tx Min Billable or 1:1 Min (if diff from Tx Min) Procedure, Rationale, Specifics   27  84835 Therapeutic Exercise (timed):  increase ROM, strength, coordination, balance, and proprioception to improve patient's ability to progress to PLOF and address remaining functional goals. (see flow sheet as applicable)     Details if applicable:       12  01144 Neuromuscular Re-Education (timed):  improve balance, coordination, kinesthetic sense, posture, core stability and proprioception to improve patient's ability to develop conscious control of individual muscles and awareness of position of extremities in order to progress to PLOF and address remaining functional goals. (see flow sheet as applicable)     Details if applicable:     8  29519 Manual Therapy (timed):  decrease pain and increase ROM to improve patient's ability to progress to PLOF and address remaining functional goals. The manual therapy interventions were performed at a separate and distinct time from the therapeutic activities interventions .  (see flow sheet as applicable)     Details if applicable:   in supine: DTM/TPR right quad (rectus femoris) and distal quad just above the right patella in flex and ext          Details if applicable:            Details if applicable:     52  [facility] BC Totals Reminder: bill using total billable min of TIMED therapeutic

## 2023-07-25 ENCOUNTER — HOSPITAL ENCOUNTER (OUTPATIENT)
Facility: HOSPITAL | Age: 28
Setting detail: RECURRING SERIES
Discharge: HOME | End: 2023-07-28
Payer: OTHER GOVERNMENT

## 2023-07-25 PROCEDURE — 97140 MANUAL THERAPY 1/> REGIONS: CPT

## 2023-07-25 PROCEDURE — 97112 NEUROMUSCULAR REEDUCATION: CPT

## 2023-07-25 PROCEDURE — 97110 THERAPEUTIC EXERCISES: CPT

## 2023-07-27 ENCOUNTER — HOSPITAL ENCOUNTER (OUTPATIENT)
Facility: HOSPITAL | Age: 28
Setting detail: RECURRING SERIES
Discharge: HOME | End: 2023-07-30
Payer: OTHER GOVERNMENT

## 2023-07-27 PROCEDURE — 97140 MANUAL THERAPY 1/> REGIONS: CPT

## 2023-07-27 PROCEDURE — 97110 THERAPEUTIC EXERCISES: CPT

## 2023-07-27 PROCEDURE — 97112 NEUROMUSCULAR REEDUCATION: CPT

## 2023-07-27 NOTE — PROGRESS NOTES
knee with therex but does report fatigue. Improved ease with daily activities and work activities reported and pt notes no pain post treatment. Patient will continue to benefit from skilled PT / OT services to modify and progress therapeutic interventions, analyze and address functional mobility deficits, analyze and address ROM deficits, analyze and address strength deficits, analyze and address soft tissue restrictions, analyze and cue for proper movement patterns, and analyze and modify for postural abnormalities to address functional deficits and attain remaining goals. Progress toward goals / Updated goals:  [x]  See Progress Note/Recertification  Improve right glute med MMT to 4+/5 for improved ability for functional tasks           PN: 3+/5 MMT  Current: Progressed to 4/5 MMT B   2. The pt will report at least 50% improvement for improved running tolerance              PN: 15-20% improvement              Current: progressing 7/19/23 30% improvement. 3. Improve FOTO score to 83 to improved ability for daily activities              PN: 56 points  4.  Improve Glute max MMT to 4+/5 for improved ability for functional tasks              PN: 3+/5 with pain in the posterior knee    Current: Met 7/27/23 right glute max 4+/5      PLAN  Yes  Continue plan of care  []  Upgrade activities as tolerated  []  Discharge due to :  []  Other:    Jonas Trejo PTA    7/27/2023    2:28 PM    Future Appointments   Date Time Provider [address]   7/27/2023  2:30 PM Jonas Trejo PTA [id number]   7/31/2023  2:30 PM Jose L Saucedo, PT Kathleen Weinstein   8/2/2023  1:50 PM ANA Nagy

## 2023-07-31 ENCOUNTER — HOSPITAL ENCOUNTER (OUTPATIENT)
Facility: HOSPITAL | Age: 28
Setting detail: RECURRING SERIES
Discharge: HOME | End: 2023-08-03
Payer: OTHER GOVERNMENT

## 2023-07-31 PROCEDURE — 97110 THERAPEUTIC EXERCISES: CPT

## 2023-07-31 PROCEDURE — 97112 NEUROMUSCULAR REEDUCATION: CPT

## 2023-07-31 PROCEDURE — 97140 MANUAL THERAPY 1/> REGIONS: CPT

## 2023-07-31 NOTE — PROGRESS NOTES
PHYSICAL / OCCUPATIONAL THERAPY - DAILY TREATMENT NOTE (updated )    Patient Name: Dorcas Trejo    Date: 2023    : 1995  Insurance: Payor: [employer] EAST / Plan: [employer] EAST / Product Type: *No Product type* /      Patient  verified Yes     Visit #   Current / Total 9 13   Time   In / Out 2:30 3:13   Pain   In / Out 5/10 4/10   Subjective Functional Status/Changes: The patient reports that her knee is really sore today after a long weekend, she did run a mile with her son, and then was on the diving board and felt some pain after her first jump, so stopped. She states it has been having pain since that time. Changes to: Allergies, Med Hx, Sx Hx?   no       TREATMENT AREA =  Right knee pain [M25.561]    OBJECTIVE  Billable or 1:1 Min (if diff from Tx Min) Procedur,Rationale, Specifics    27   72653 Therapeutic Exercise (timed):  increase ROM, strength, coordination, balance, and proprioception to improve patient's ability to progress to PLOF and address remaining functional goals. (see flow sheet as applicable)      Details if applicable:        8   64539 Neuromuscular Re-Education (timed):  improve balance, coordination, kinesthetic sense, posture, core stability and proprioception to improve patient's ability to develop conscious control of individual muscles and awareness of position of extremities in order to progress to PLOF and address remaining functional goals. (see flow sheet as applicable)      Details if applicable:      8   42526 Manual Therapy (timed):  decrease pain, increase ROM, and increase tissue extensibility to improve patient's ability to progress to PLOF and address remaining functional goals. The manual therapy interventions were performed at a separate and distinct time from the therapeutic activities interventions .  (see flow sheet as applicable)      Details if applicable:  [place] with GT 5 to the right medial quad and TPR to the same with pt reclined   42   MC

## 2023-08-02 ENCOUNTER — HOSPITAL ENCOUNTER (OUTPATIENT)
Facility: HOSPITAL | Age: 28
Setting detail: RECURRING SERIES
Discharge: HOME | End: 2023-08-05
Payer: OTHER GOVERNMENT

## 2023-08-02 PROCEDURE — 97112 NEUROMUSCULAR REEDUCATION: CPT

## 2023-08-02 PROCEDURE — 97110 THERAPEUTIC EXERCISES: CPT

## 2023-08-02 PROCEDURE — 97140 MANUAL THERAPY 1/> REGIONS: CPT

## 2023-08-02 NOTE — PROGRESS NOTES
[address] [facility] [address] [facility] - Ph: [phone]   Fx: [phone]    PHYSICAL THERAPY PROGRESS NOTE      Patient name: Catie Hearn Start of Care: 2023   Referral source: Silvia Abreu DO : 1995    Medical Diagnosis: Right knee pain [M25.561]  Payor: [employer] EAST / Plan: [employer] EAST / Product Type: *No Product type* /  Onset Date::2023                Treatment Diagnosis: M25.561  RIGHT KNEE PAIN                                      Prior Hospitalization: see medical history Provider#: [id number]   Medications: Verified on Patient summary List   Comorbidities: latex allergy, depression  Prior Level of Function: functionally I with all activities    Visits from Start of Care: 10    Missed Visits: 0    Goals/Measure of Progress: To be achieved in 3 weeks:  Improve right glute med MMT to 4+/5 for improved ability for functional tasks           PN: Progressed to 4/5 MMT B   2. The pt will report at least 50% improvement for improved running tolerance              PN: progressing 30% improvement   3. Improve FOTO score to 83 to improved ability for daily activities                         PN: progressing FOTO score 75%      4. Improve Glute max MMT to 4+/5 for improved ability for functional tasks               PN: Met right glute max 4+/5 MMT      Summary of Care/ Key Functional Changes:   Pt is progressing well with treatment to the right knee reporting about a 50% improvement in her right knee since [facility]. The pt notes no pain with jogging for about a mile and has displayed improved glute max strength. The pt continues to display mm restrictions and tightness at the medial quad and notes increased pain in that same area with prolonged/increased activity.  The pt is to benefit from continued treatment to improve right glute med strength, to decrease mm restrictions and to improve overall functional mobility to aid
Glute max MMT to 4+/5 for improved ability for functional tasks              PN: 3+/5 with pain in the posterior knee               Current: Met 7/27/23 right glute max 4+/5 MMT      PLAN  Yes  Continue plan of care  []  Upgrade activities as tolerated  []  Discharge due to :  []  Other:    Keily Johnston PTA    8/2/2023    1:48 PM    Future Appointments   Date Time Provider [address] [place] [address]   8/2/2023  1:50 PM ANA Mcamhan

## 2023-08-08 ENCOUNTER — HOSPITAL ENCOUNTER (OUTPATIENT)
Facility: HOSPITAL | Age: 28
Setting detail: RECURRING SERIES
Discharge: HOME | End: 2023-08-11
Payer: OTHER GOVERNMENT

## 2023-08-08 PROCEDURE — 97110 THERAPEUTIC EXERCISES: CPT

## 2023-08-08 PROCEDURE — 97140 MANUAL THERAPY 1/> REGIONS: CPT

## 2023-08-08 PROCEDURE — 97112 NEUROMUSCULAR REEDUCATION: CPT

## 2023-08-11 ENCOUNTER — APPOINTMENT (OUTPATIENT)
Facility: HOSPITAL | Age: 28
End: 2023-08-11
Payer: OTHER GOVERNMENT

## 2023-08-18 ENCOUNTER — HOSPITAL ENCOUNTER (OUTPATIENT)
Facility: HOSPITAL | Age: 28
Setting detail: RECURRING SERIES
Discharge: HOME | End: 2023-08-21
Payer: OTHER GOVERNMENT

## 2023-08-18 PROCEDURE — 97112 NEUROMUSCULAR REEDUCATION: CPT

## 2023-08-18 PROCEDURE — 97110 THERAPEUTIC EXERCISES: CPT

## 2023-08-18 PROCEDURE — 97140 MANUAL THERAPY 1/> REGIONS: CPT

## 2023-08-25 ENCOUNTER — APPOINTMENT (OUTPATIENT)
Facility: HOSPITAL | Age: 28
End: 2023-08-25
Payer: OTHER GOVERNMENT

## 2024-10-14 ENCOUNTER — HOSPITAL ENCOUNTER (EMERGENCY)
Facility: HOSPITAL | Age: 29
Discharge: HOME | End: 2024-10-14
Attending: STUDENT IN AN ORGANIZED HEALTH CARE EDUCATION/TRAINING PROGRAM
Payer: OTHER GOVERNMENT

## 2024-10-14 VITALS
HEART RATE: 72 BPM | RESPIRATION RATE: 16 BRPM | HEIGHT: 63 IN | WEIGHT: 167 LBS | SYSTOLIC BLOOD PRESSURE: 117 MMHG | TEMPERATURE: 97.9 F | BODY MASS INDEX: 29.59 KG/M2 | DIASTOLIC BLOOD PRESSURE: 68 MMHG | OXYGEN SATURATION: 100 %

## 2024-10-14 DIAGNOSIS — G43.009 MIGRAINE WITHOUT AURA AND WITHOUT STATUS MIGRAINOSUS, NOT INTRACTABLE: Primary | ICD-10-CM

## 2024-10-14 LAB — GLUCOSE BLD STRIP.AUTO-MCNC: 90 MG/DL (ref 70–110)

## 2024-10-14 PROCEDURE — 6370000000 HC RX 637 (ALT 250 FOR IP)

## 2024-10-14 PROCEDURE — 82962 GLUCOSE BLOOD TEST: CPT

## 2024-10-14 PROCEDURE — 99283 EMERGENCY DEPT VISIT LOW MDM: CPT

## 2024-10-14 RX ORDER — ACETAMINOPHEN 500 MG
1000 TABLET ORAL ONCE
Status: COMPLETED | OUTPATIENT
Start: 2024-10-14 | End: 2024-10-14

## 2024-10-14 RX ORDER — BUTALBITAL/ASPIRIN/CAFFEINE 50-325-40
1 CAPSULE ORAL EVERY 6 HOURS PRN
Qty: 10 CAPSULE | Refills: 0 | Status: SHIPPED | OUTPATIENT
Start: 2024-10-14 | End: 2024-10-16

## 2024-10-14 RX ORDER — IBUPROFEN 400 MG/1
800 TABLET, FILM COATED ORAL ONCE
Status: COMPLETED | OUTPATIENT
Start: 2024-10-14 | End: 2024-10-14

## 2024-10-14 RX ADMIN — ACETAMINOPHEN 1000 MG: 500 TABLET ORAL at 12:58

## 2024-10-14 RX ADMIN — IBUPROFEN 800 MG: 400 TABLET, FILM COATED ORAL at 12:57

## 2024-10-14 ASSESSMENT — PAIN - FUNCTIONAL ASSESSMENT: PAIN_FUNCTIONAL_ASSESSMENT: 0-10

## 2024-10-14 ASSESSMENT — PAIN SCALES - GENERAL: PAINLEVEL_OUTOF10: 8

## 2024-10-14 NOTE — ED TRIAGE NOTES
Ambulatory to QB with steady gait stating \"I have had vertigo when I move my head and sharp pain to my right temple x 2 days\". States had right ear irrigated 1 week ago, endorses sinus congestion and mild photophobia. BEFAST neg at triage

## 2024-10-14 NOTE — ED PROVIDER NOTES
Pulmonary effort is normal.      Breath sounds: Normal breath sounds.   Abdominal:      General: Abdomen is flat.      Palpations: Abdomen is soft.   Musculoskeletal:         General: Normal range of motion.      Cervical back: No rigidity.   Neurological:      General: No focal deficit present.      Mental Status: She is oriented to person, place, and time.      Cranial Nerves: No cranial nerve deficit.         Diagnostic Study Results     Labs -     Recent Results (from the past 12 hour(s))   POCT Glucose    Collection Time: 10/14/24 11:10 AM   Result Value Ref Range    POC Glucose 90 70 - 110 mg/dL      Labs Reviewed   POCT GLUCOSE       Radiologic Studies -   No orders to display         The laboratory results, imaging results, and other diagnostic exams were reviewed in the EMR.    Medical Decision Making   I am the first provider for this patient.    I reviewed the vital signs, available nursing notes, past medical history, past surgical history, family history and social history.    Vital Signs-Reviewed the patient's vital signs.       Records Reviewed: Personally, on initial evaluation    MDM:   DDX includes but is not limited to: migraine, tension headache, no suspicion for tumor, pseudotumor cerebri at this time      Orders as below:  Orders Placed This Encounter   Procedures    POC GLUCOSE    POCT Glucose          ED Course:      Patient administered tylenol, ibuprofen for migraine     Discharged with short course of fioricet, PCP followup migraine prevention and management    Is this patient to be included in the SEP-1 core measure? No Exclusion criteria - the patient is NOT to be included for SEP-1 Core Measure due to: 2+ SIRS criteria are not met      Procedures:  Procedures      Social Determinants of Health: none       Supplemental Historians include: n/a       Documentation/Prior Results Review:  Old medical records.  Nursing notes.      Diagnosis and Disposition     CLINICAL IMPRESSION:  No

## 2024-10-17 ENCOUNTER — HOSPITAL ENCOUNTER (EMERGENCY)
Facility: HOSPITAL | Age: 29
Discharge: HOME | End: 2024-10-17
Attending: EMERGENCY MEDICINE
Payer: OTHER GOVERNMENT

## 2024-10-17 VITALS
BODY MASS INDEX: 29.59 KG/M2 | SYSTOLIC BLOOD PRESSURE: 112 MMHG | HEART RATE: 65 BPM | DIASTOLIC BLOOD PRESSURE: 69 MMHG | TEMPERATURE: 98.5 F | HEIGHT: 63 IN | RESPIRATION RATE: 18 BRPM | OXYGEN SATURATION: 99 % | WEIGHT: 167 LBS

## 2024-10-17 DIAGNOSIS — K08.89 TOOTHACHE: Primary | ICD-10-CM

## 2024-10-17 PROCEDURE — 6370000000 HC RX 637 (ALT 250 FOR IP): Performed by: EMERGENCY MEDICINE

## 2024-10-17 PROCEDURE — 99283 EMERGENCY DEPT VISIT LOW MDM: CPT

## 2024-10-17 RX ORDER — HYDROCODONE BITARTRATE AND ACETAMINOPHEN 5; 325 MG/1; MG/1
1 TABLET ORAL EVERY 6 HOURS PRN
Qty: 12 TABLET | Refills: 0 | Status: SHIPPED | OUTPATIENT
Start: 2024-10-17 | End: 2024-10-20

## 2024-10-17 RX ORDER — AMOXICILLIN 250 MG/1
500 CAPSULE ORAL
Status: COMPLETED | OUTPATIENT
Start: 2024-10-17 | End: 2024-10-17

## 2024-10-17 RX ORDER — IBUPROFEN 600 MG/1
600 TABLET, FILM COATED ORAL
Status: COMPLETED | OUTPATIENT
Start: 2024-10-17 | End: 2024-10-17

## 2024-10-17 RX ORDER — AMOXICILLIN 500 MG/1
500 CAPSULE ORAL 3 TIMES DAILY
Qty: 30 CAPSULE | Refills: 0 | Status: SHIPPED | OUTPATIENT
Start: 2024-10-17 | End: 2024-10-27

## 2024-10-17 RX ADMIN — AMOXICILLIN 500 MG: 250 CAPSULE ORAL at 22:14

## 2024-10-17 RX ADMIN — IBUPROFEN 600 MG: 600 TABLET, FILM COATED ORAL at 22:14

## 2024-10-17 ASSESSMENT — LIFESTYLE VARIABLES
HOW MANY STANDARD DRINKS CONTAINING ALCOHOL DO YOU HAVE ON A TYPICAL DAY: PATIENT DOES NOT DRINK
HOW OFTEN DO YOU HAVE A DRINK CONTAINING ALCOHOL: NEVER

## 2024-10-17 ASSESSMENT — PAIN DESCRIPTION - DESCRIPTORS: DESCRIPTORS: ACHING

## 2024-10-17 ASSESSMENT — PAIN DESCRIPTION - LOCATION: LOCATION: TEETH

## 2024-10-17 ASSESSMENT — PAIN DESCRIPTION - PAIN TYPE: TYPE: ACUTE PAIN

## 2024-10-17 ASSESSMENT — PAIN DESCRIPTION - ORIENTATION: ORIENTATION: RIGHT;LEFT;UPPER

## 2024-10-17 ASSESSMENT — ENCOUNTER SYMPTOMS: FACIAL SWELLING: 0

## 2024-10-17 ASSESSMENT — PAIN - FUNCTIONAL ASSESSMENT: PAIN_FUNCTIONAL_ASSESSMENT: 0-10

## 2024-10-17 ASSESSMENT — PAIN SCALES - GENERAL: PAINLEVEL_OUTOF10: 6

## 2024-10-18 NOTE — ED PROVIDER NOTES
infection, TMJ syndrome    Risk of Complications, Morbidity, and/or Mortality  Presenting problems: low  Diagnostic procedures: low  Management options: low  General comments: Hospital course: Patient treated with amoxicillin and Motrin.        No orders to display     9:46 PM  Upon re-evaluation the patient's symptoms have improved. Pt has non-toxic appearance and condition is stable for discharge. Patient was informed of tests & results, instructed to f/u with PCP and return to the ED upon worsening of symptoms. All questions and concerns were addressed.       Procedures    FINAL IMPRESSION      Acute toothache      DISPOSITION/PLAN     DISPOSITION  D/C ome    Condition at Disposition: stable    DISCHARGE MEDICATIONS:    Amoxicillin, vicodin     PATIENT REFERRED TO: dentist in 2 days.  Return to ER prn.      (Please note that portions of this note were completed with a voice recognitionprogram.  Efforts were made to edit the dictations but occasionally words are mis-transcribed.)    Roc Hummel MD(electronically signed)  Attending Emergency Physician          Roc Hummel MD  10/17/24 0500